# Patient Record
Sex: MALE | Race: WHITE | NOT HISPANIC OR LATINO | Employment: OTHER | URBAN - NONMETROPOLITAN AREA
[De-identification: names, ages, dates, MRNs, and addresses within clinical notes are randomized per-mention and may not be internally consistent; named-entity substitution may affect disease eponyms.]

---

## 2017-02-08 ENCOUNTER — TRANSCRIBE ORDERS (OUTPATIENT)
Dept: PHYSICAL THERAPY | Facility: HOSPITAL | Age: 22
End: 2017-02-08

## 2017-02-08 ENCOUNTER — HOSPITAL ENCOUNTER (OUTPATIENT)
Dept: PHYSICAL THERAPY | Facility: HOSPITAL | Age: 22
Setting detail: THERAPIES SERIES
Discharge: HOME | End: 2017-02-08

## 2017-02-08 DIAGNOSIS — M79.672 BILATERAL FOOT PAIN: ICD-10-CM

## 2017-02-08 DIAGNOSIS — M25.551 BILATERAL HIP PAIN: Primary | ICD-10-CM

## 2017-02-08 DIAGNOSIS — M79.672 LEFT FOOT PAIN: Primary | ICD-10-CM

## 2017-02-08 DIAGNOSIS — M25.562 BILATERAL ANTERIOR KNEE PAIN: ICD-10-CM

## 2017-02-08 DIAGNOSIS — M79.671 BILATERAL FOOT PAIN: ICD-10-CM

## 2017-02-08 DIAGNOSIS — M25.561 BILATERAL ANTERIOR KNEE PAIN: ICD-10-CM

## 2017-02-08 DIAGNOSIS — M25.552 BILATERAL HIP PAIN: Primary | ICD-10-CM

## 2017-02-08 PROCEDURE — 97161 PT EVAL LOW COMPLEX 20 MIN: CPT | Performed by: PHYSICAL THERAPIST

## 2017-02-08 NOTE — PROGRESS NOTES
Outpatient Physical Therapy Ortho Initial Evaluation  [facility]     Patient Name: Artemio Lockhart  : 1995  MRN: [id number]  Today's Date: 2017      Visit Date: 2017     Visit 1 of 1  Recert date 3/1/17      There is no problem list on file for this patient.       Past Medical History   Diagnosis Date   • Cancer         Past Surgical History   Procedure Laterality Date   • Hernia repair         Visit Dx:     ICD-10-CM ICD-9-CM   1. Left foot pain M79.672 729.5             Patient History       17 1300          History    Chief Complaint Difficulty Walking;Pain;Numbness;Muscle weakness  -KW      Type of Pain Ankle pain  -KW      Brief Description of Current Complaint deformity of L foot   -KW      Previous treatment for THIS PROBLEM Rehabilitation  -KW      Hand Dominance left-handed  -KW      Occupation/sports/leisure activities basket ball, walking  -KW      Surgery Date 1 16  -KW      Pain     Pain Location Foot  -KW      Pain at Present 6  -KW      Tolerance Time- Standing 15 min  -KW      Tolerance Time- Walking 5 min  -KW      Fall Risk Assessment    Any falls in the past year: No  -KW        User Key  (r) = Recorded By, (t) = Taken By, (c) = Cosigned By    Initials Name Provider Type    KW Sonja Gusman, PT Physical Therapist                PT Ortho       17 1300    Subjective Comments    Subjective Comments Reports L foot is very sensitive  -KW    Precautions and Contraindications    Precautions/Limitations other (see comments)   WBAT L LE  -KW    Subjective Pain    Able to rate subjective pain? yes  -KW    Pre-Treatment Pain Level 6  -KW    Posture/Observations    Posture/Observations Comments muscle atrophy on L quads, calf, and foot  -KW    ROM (Range of Motion)    Additional Documentation --   AROM L DF 5, PF 30, INV 12, EV 0  -KW    MMT (Manual Muscle Testing)    General MMT Assessment Detail --   L DF 3+/5, PF, 3/5, Inv 3+/5, EV 2+/5  -KW     Flexibility    Flexibility Tested? Lower Extremity  -KW    Lower Extremity Flexibility    Hamstrings Left:;Moderately limited  -KW    Gastrocnemius Left:;Moderately limited  -KW    Soleus Left:;Moderately limited  -KW    Balance Skills Training    SLS L SLS for 5 sec  -KW    Gait Assessment/Treatment    Gait, Comment patient ambulates with CAM boot and is WBAT on L LE  -KW      User Key  (r) = Recorded By, (t) = Taken By, (c) = Cosigned By    Initials Name Provider Type    LUISA Gusman PT Physical Therapist                            Therapy Education       02/08/17 1357    Therapy Education    Given HEP  -KW    Program New  -KW    How Provided Written  -KW    Provided to Patient  -KW      User Key  (r) = Recorded By, (t) = Taken By, (c) = Cosigned By    Initials Name Provider Type    LUISA Gusman PT Physical Therapist                PT OP Goals       02/08/17 1400          PT Short Term Goals    STG Date to Achieve 02/22/17  -KW      STG 1 decrease L foot pain to 4/10  -KW      STG 2 Increase AROM L foot by 5 degrees in all planes  -KW      STG 3 Inc SLS L foot to 10 sec  -KW      Long Term Goals    LTG Date to Achieve 02/28/17  -KW      LTG 1 ambulate in L AFO with minimal pain  -KW      LTG 2 Inc L AROM in foot to WFL  -KW      LTG 3 Inc L foot DF and PF to 4/5 MS  -KW      LTG 4 Inc LEFS score to > or equal to 70/80  -KW      Time Calculation    PT Goal Re-Cert Due Date 03/01/17  -KW        User Key  (r) = Recorded By, (t) = Taken By, (c) = Cosigned By    Initials Name Provider Type    LUISA Gusman, PT Physical Therapist                PT Assessment/Plan       02/08/17 1359          PT Assessment    Functional Limitations Impaired gait;Impaired locomotion  -KW      Impairments Balance;Gait;Endurance;Pain;Muscle strength;Range of motion;Sensation;Joint mobility  -KW      Assessment Comments hypersensitivity of L foot, pain and weakness  -KW      Please refer to paper survey for additional  self-reported information No  -KW      Rehab Potential Good  -KW      Patient/caregiver participated in establishment of treatment plan and goals Yes  -KW      Patient would benefit from skilled therapy intervention Yes  -KW      PT Plan    PT Frequency 2x/week  -KW      Predicted Duration of Therapy Intervention (days/wks) 4 weeks  -KW      Planned CPT's? PT EVAL: 14249;PT RE-EVAL: 97086;PT THER PROC EA 15 MIN: 35944;PT THER ACT EA 15 MIN: 26852;PT MANUAL THERAPY EA 15 MIN: 92762;PT AQUATIC THERAPY EA 15 MIN: 25808;PT GAIT TRAINING EA 15 MIN: 74667;PT ELECTRICAL STIM UNATTEND: ;PT THER SUPP EA 15 MIN  -KW      Physical Therapy Interventions (Optional Details) aquatics exercise;balance training;gait training;home exercise program;joint mobilization;modalities;manual therapy techniques;ROM (Range of Motion);stair training;strengthening;stretching  -KW      PT Plan Comments 2x week PT 1 land 1 pool  -KW        User Key  (r) = Recorded By, (t) = Taken By, (c) = Cosigned By    Initials Name Provider Type    LUISA Gusman, PT Physical Therapist                  Exercises       02/08/17 1300          Subjective Comments    Subjective Comments Reports L foot is very sensitive  -KW      Subjective Pain    Able to rate subjective pain? yes  -KW      Pre-Treatment Pain Level 6  -KW        User Key  (r) = Recorded By, (t) = Taken By, (c) = Cosigned By    Initials Name Provider Type    LUISA Gusman, PT Physical Therapist                              Outcome Measures       02/08/17 1400          Lower Extremity Functional Index    Any of your usual work, housework or school activities 4  -KW      Your usual hobbies, recreational or sporting activities 4  -KW      Getting into or out of the bath 3  -KW      Walking between rooms 3  -KW      Putting on your shoes or socks 3  -KW      Squatting 3  -KW      Lifting an object, like a bag of groceries from the floor 4  -KW      Performing light activities around your  home 3  -KW      Performing heavy activities around your home 3  -KW      Getting into or out of a car 3  -KW      Walking 2 blocks 2  -KW      Walking a mile 1  -KW      Going up or down 10 stairs (about 1 flight of stairs) 3  -KW      Standing for 1 hour 1  -KW      Sitting for 1 hour 4  -KW      Running on even ground 1  -KW      Running on uneven ground 1  -KW      Making sharp turns while running fast 1  -KW      Hopping 2  -KW      Rolling over in bed 3  -KW      Total 52  -KW      Functional Assessment    Outcome Measure Options Lower Extremity Functional Scale (LEFS)  -KW        User Key  (r) = Recorded By, (t) = Taken By, (c) = Cosigned By    Initials Name Provider Type    KW Sonja Gusman, PT Physical Therapist            Time Calculation:   Start Time: 1300  Stop Time: 1350  Time Calculation (min): 50 min  Total Timed Code Minutes- PT: 50 minute(s)     Therapy Charges for Today     Code Description Service Date Service Provider Modifiers Qty    35081396847 HC PT EVAL LOW COMPLEXITY 3 2/8/2017 Sonja Gusman, PT GP 1          PT G-Codes  Outcome Measure Options: Lower Extremity Functional Scale (LEFS)         Sonja Gusman, PT  2/8/2017

## 2017-02-13 ENCOUNTER — APPOINTMENT (OUTPATIENT)
Dept: PHYSICAL THERAPY | Facility: HOSPITAL | Age: 22
End: 2017-02-13

## 2017-02-20 ENCOUNTER — HOSPITAL ENCOUNTER (OUTPATIENT)
Dept: PHYSICAL THERAPY | Facility: HOSPITAL | Age: 22
Setting detail: THERAPIES SERIES
Discharge: HOME | End: 2017-02-20

## 2017-02-20 DIAGNOSIS — M79.672 LEFT FOOT PAIN: Primary | ICD-10-CM

## 2017-02-20 PROCEDURE — 97110 THERAPEUTIC EXERCISES: CPT

## 2017-02-20 PROCEDURE — 97116 GAIT TRAINING THERAPY: CPT

## 2017-02-20 NOTE — PROGRESS NOTES
Outpatient Physical Therapy Ortho Treatment Note  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 DOS 16, Removed hardware 2017.  WBAT out of boot and into AFO no restrictions per MD order.     Visit Dx:    ICD-10-CM ICD-9-CM   1. Left foot pain M79.672 729.5       There is no problem list on file for this patient.       Past Medical History   Diagnosis Date   • Cancer    • Cellulitis    • Closed fracture of foot      Closed fracture of foot - History of   • Congenital anomaly of foot    • Congenital pes cavus    • Cradle cap      Cradle cap - History of   • Foot pain      Growth plate inflammation, left foot   • H/O leukemia    • Myopia    • Otitis media      Bilateral        Past Surgical History   Procedure Laterality Date   • Hernia repair     • Ankle surgery Left 2015     First Ankle surgery to correct cavovarus deformity/Second surgery 16             PT Ortho       17 0846    Precautions and Contraindications    Precautions WBAT Left LE with AFO  -    Contraindications No restrictions per MD order. Progress as pt tolerates out of Walking boot.   -    Posture/Observations    Posture/Observations Comments Scars healing very tender Left foot around scars.  Muscle atrophy also noted in Left LE.    -    Sensation    Additional Comments Educated pt and caregiver on desenization of left foot for increased gait and strength needed.  Very sensitive TTP left foot.    -    Gait Assessment/Treatment    Gait, Comment Pt ambulates with walking boot on left into clinic. Donned AFO on left for walking during treatment and exited building with AFO.  Pain with gait in lateral  foot secondary to rubbing of brace with gait.  Reddness noted over lateral scar after gait lasted 10 minutes.    -      User Key  (r) = Recorded By, (t) = Taken By, (c) = Cosigned By    Initials Name Provider Type    FRANCY Steele PTA Physical Therapy Assistant                            PT Assessment/Plan       02/20/17 0846          PT Assessment    Assessment Comments Encouraged pt to increase gt with AFO.  Increased HEP this date pain with WB secondary tenderness.  Reddness noted over lateral scar just inferior to lateral mallioli after 5 minutes of gait. Lasted 10 minutes. Educated mother that if reddness continues call orthotist to have it adjusted.   -      PT Plan    PT Plan Comments Pt to be seen in aquatics next. 1xland/1pool per PT.  Recheck scheduled for next week.  Monitor reddness and gait with brace.  Increase strength as able.  -        User Key  (r) = Recorded By, (t) = Taken By, (c) = Cosigned By    Initials Name Provider Type    FRANCY Steele PTA Physical Therapy Assistant                    Exercises       02/20/17 0846          Subjective Comments    Subjective Comments Mom present reports that they have tried doing exercises at home.  Still not increasing walking with AFo secondary to tenderness and still sensitive. Norman reports he pulled off a piece of skin on bottom of left foot thats why he is wearing a bandaid.   -      Subjective Pain    Able to rate subjective pain? yes  -      Pre-Treatment Pain Level 5  -KH      Post-Treatment Pain Level 5  -KH      Exercise 1    Exercise Name 1 Gastroc Stretch with Towel  -      Reps 1 3  -KH      Time (Seconds) 1 30  -KH      Exercise 2    Exercise Name 2 Toe scrunches  -      Time (Minutes) 2 3  -KH      Exercise 3    Exercise Name 3 Bridges  -KH      Sets 3 2  -KH      Reps 3 10  -KH      Exercise 4    Exercise Name 4 Clam Shells B  -KH      Sets 4 2  -KH      Reps 4 10  -KH      Exercise 5    Exercise Name 5 Supine Quad Sets  -KH       Sets 5 2  -KH      Reps 5 10  -KH      Exercise 6    Exercise Name 6 Gait // Bars with AFO L  -KH      Reps 6 4  -KH      Exercise 7    Exercise Name 7 Seated CR/TR  -KH      Sets 7 2  -KH      Reps 7 10  -KH        User Key  (r) = Recorded By, (t) = Taken By, (c) = Cosigned By    Initials Name Provider Type    FRANCY Steele PTA Physical Therapy Assistant                               PT OP Goals       02/20/17 0846 02/08/17 1400       PT Short Term Goals    STG Date to Achieve 02/22/17  -KH 02/22/17  -KW     STG 1 decrease L foot pain to 4/10  -KH decrease L foot pain to 4/10  -KW     STG 1 Progress Not Met  -KH      STG 2 Increase AROM L foot by 5 degrees in all planes  -KH Increase AROM L foot by 5 degrees in all planes  -KW     STG 2 Progress Not Met  -KH      STG 3 Inc SLS L foot to 10 sec  -KH Inc SLS L foot to 10 sec  -KW     STG 3 Progress Not Met  -KH      Long Term Goals    LTG Date to Achieve 02/28/17  -KH 02/28/17  -KW     LTG 1 ambulate in L AFO with minimal pain  -KH ambulate in L AFO with minimal pain  -KW     LTG 1 Progress Progressing  -KH      LTG 2 Inc L AROM in foot to WFL  -KH Inc L AROM in foot to WFL  -KW     LTG 2 Progress Not Met  -KH      LTG 3 Inc L foot DF and PF to 4/5 MS  -KH Inc L foot DF and PF to 4/5 MS  -KW     LTG 3 Progress Not Met  -KH      LTG 4 Inc LEFS score to > or equal to 70/80  -KH Inc LEFS score to > or equal to 70/80  -KW     LTG 4 Progress Not Met  -KH      Time Calculation    PT Goal Re-Cert Due Date 03/01/17   2/3, 0%, 7 visits, MD-unsure  -KH 03/01/17  -KW       User Key  (r) = Recorded By, (t) = Taken By, (c) = Cosigned By    Initials Name Provider Type    FRANCY Steele PTA Physical Therapy Assistant    LUISA Gusman, PT Physical Therapist                Therapy Education       02/20/17 0846    Therapy Education    Given HEP   Bridges, Clam Shells, Quad sets, CR/TR, Gait with AFO  -KH    Program Reinforced   Desenization of scars and foot  -KH    How  Provided Verbal;Demonstration;Written  -FRANCY    Provided to Patient;Caregiver  -FRANCY    Level of Understanding Teach back education performed;Verbalized;Demonstrated  -FRANCY      User Key  (r) = Recorded By, (t) = Taken By, (c) = Cosigned By    Initials Name Provider Type    FRANCY Steele PTA Physical Therapy Assistant                Time Calculation:   Start Time: 0846  Stop Time: 0946  Time Calculation (min): 60 min  Total Timed Code Minutes- PT: 60 minute(s)    Therapy Charges for Today     Code Description Service Date Service Provider Modifiers Qty    68828507987  PT THER PROC EA 15 MIN 2/20/2017 Letha Steele PTA GP 3    [id number]  GAIT TRAINING EA 15 MIN 2/20/2017 Letha Steele PTA GP 1                    Letha Steele PTA  2/20/2017

## 2017-02-21 ENCOUNTER — OFFICE VISIT (OUTPATIENT)
Dept: OPHTHALMOLOGY | Facility: CLINIC | Age: 22
End: 2017-02-21

## 2017-02-21 DIAGNOSIS — H52.13 MYOPIA, BILATERAL: Primary | ICD-10-CM

## 2017-02-21 PROCEDURE — 92012 INTRM OPH EXAM EST PATIENT: CPT | Performed by: OPHTHALMOLOGY

## 2017-02-21 RX ORDER — IBUPROFEN 400 MG/1
400 TABLET ORAL EVERY 6 HOURS
COMMUNITY
Start: 2016-11-30

## 2017-02-21 RX ORDER — ERGOCALCIFEROL 1.25 MG/1
CAPSULE ORAL
COMMUNITY
Start: 2016-11-21

## 2017-02-21 RX ORDER — DIAZEPAM 2 MG/1
TABLET ORAL
Refills: 0 | COMMUNITY
Start: 2016-11-30

## 2017-02-21 RX ORDER — ONDANSETRON 4 MG/1
TABLET, FILM COATED ORAL
Refills: 0 | COMMUNITY
Start: 2016-11-30

## 2017-02-21 RX ORDER — OSELTAMIVIR PHOSPHATE 75 MG/1
CAPSULE ORAL
Refills: 0 | COMMUNITY
Start: 2017-02-12

## 2017-02-21 RX ORDER — GABAPENTIN 100 MG/1
CAPSULE ORAL
COMMUNITY
Start: 2016-12-14

## 2017-02-21 NOTE — PROGRESS NOTES
Subjective   Artemio Lockhart is a 21 y.o. male.   Chief Complaint   Patient presents with   • Eye Exam   • Myopia     HPI     Eye Exam   Laterality: both eyes           Comments   No vision change       Last edited by Edis Condon MD on 2/21/2017 10:08 AM. (History)          Review of Systems    Objective   Visual Acuity (Snellen - Linear)      Right Left   Dist cc 20/25 20/20       Correction:  Glasses         Wearing Rx      Sphere Axis   Right -2.50 180   Left -2.50 180           Manifest Refraction      Sphere Axis   Right -2.50 180   Left -2.50 180            Final Rx      Sphere Axis   Right -2.50 180   Left -2.50 180            Pupils      Pupils   Right PERRL   Left PERRL           Confrontational Visual Fields     Visual Fields      Left Right   Result Full Full                  Extraocular Movement      Right Left   Result Full Full                 Main Ophthalmology Exam     External Exam      Right Left    External Normal Normal      Slit Lamp Exam      Right Left    Lids/Lashes Normal Normal    Conjunctiva/Sclera White and quiet White and quiet    Cornea Clear Clear    Anterior Chamber Deep and quiet Deep and quiet    Iris Round and reactive Round and reactive    Lens Clear Clear    Vitreous Normal Normal      Fundus Exam      Right Left    Disc Normal Normal    Macula Normal Normal    Vessels Normal Normal                Assessment/Plan   Diagnoses and all orders for this visit:    Myopia, bilateral    Glasses Rx given per refraction         Return if symptoms worsen or fail to improve.

## 2017-02-22 ENCOUNTER — HOSPITAL ENCOUNTER (OUTPATIENT)
Dept: PHYSICAL THERAPY | Facility: HOSPITAL | Age: 22
Setting detail: THERAPIES SERIES
Discharge: HOME | End: 2017-02-22

## 2017-02-22 DIAGNOSIS — M79.672 LEFT FOOT PAIN: Primary | ICD-10-CM

## 2017-02-22 PROCEDURE — 97110 THERAPEUTIC EXERCISES: CPT

## 2017-02-22 NOTE — PROGRESS NOTES
Outpatient Physical Therapy Ortho Treatment Note  [facility]     Patient Name: Artemio Lockhart  : 1995  MRN: [id number]  Today's Date: 2017      Visit Date: 2017  Subjective Improvement: 0%  MD visit: unsure  Visit Number: 3/4  Total Approved:7 visits  Recert Date: 3-1-17        Visit Dx:    ICD-10-CM ICD-9-CM   1. Left foot pain M79.672 729.5       There is no problem list on file for this patient.       Past Medical History   Diagnosis Date   • Cancer    • Cellulitis    • Closed fracture of foot      Closed fracture of foot - History of   • Congenital anomaly of foot    • Congenital pes cavus    • Cradle cap      Cradle cap - History of   • Foot pain      Growth plate inflammation, left foot   • H/O leukemia    • Myopia    • Otitis media      Bilateral        Past Surgical History   Procedure Laterality Date   • Hernia repair     • Ankle surgery Left 2015     First Ankle surgery to correct cavovarus deformity/Second surgery 16             PT Ortho       17 0929    Posture/Observations    Posture/Observations Comments AFO Left this date.  No reddness noted over lateral scar this date.  Scars good and healing.  Decrease heel-toe gait.   -      17 0846    Precautions and Contraindications    Precautions WBAT Left LE with AFO  -    Contraindications No restrictions per MD order. Progress as pt tolerates out of Walking boot.   -    Posture/Observations    Posture/Observations Comments Scars healing very tender Left foot around scars.  Muscle atrophy also noted in Left LE.    -    Sensation    Additional Comments Educated pt and caregiver on desenization of left foot for increased gait and strength needed.  Very sensitive TTP left foot.    -    Gait Assessment/Treatment    Gait, Comment Pt ambulates with walking boot on left into clinic. Donned AFO on left for walking during treatment and exited building with AFO.  Pain with gait in lateral foot  secondary to rubbing of brace with gait.  Reddness noted over lateral scar after gait lasted 10 minutes.    -      User Key  (r) = Recorded By, (t) = Taken By, (c) = Cosigned By    Initials Name Provider Type    FRANCY Steele PTA Physical Therapy Assistant                            PT Assessment/Plan       02/22/17 0929 02/20/17 0846       PT Assessment    Assessment Comments Initated aquatics this date.  Pt did well multiple VC to perform exercises correctly and correct gait.   - Encouraged pt to increase gt with AFO.  Increased HEP this date pain with WB secondary tenderness.  Reddness noted over lateral scar just inferior to lateral mallioli after 5 minutes of gait. Lasted 10 minutes. Educated mother that if reddness continues call orthotist to have it adjusted.   -     PT Plan    PT Plan Comments Continue to increase gait and desensitize left foot. Aquatics as able. Recheck next week.    -FRANCY Pt to be seen in aquatics next. 1xland/1pool per PT.  Recheck scheduled for next week.  Monitor reddness and gait with brace.  Increase strength as able.  -       User Key  (r) = Recorded By, (t) = Taken By, (c) = Cosigned By    Initials Name Provider Type    FRANCY Steele PTA Physical Therapy Assistant                    Exercises       02/22/17 0929          Subjective Comments    Subjective Comments pt report that he has been rubbing the foot at home to help with the sensitvity.  also reports thatbhe has been wearing his brace and not having any reddness.    -      Subjective Pain    Able to rate subjective pain? yes  -      Pre-Treatment Pain Level 3  -      Post-Treatment Pain Level 3  -      Subjective Pain Comment    -      Aquatics    Aquatics performed? Yes  -      Exercise 1    Exercise Name 1 aqu: walking chest deep fwd lat bkds  -      Time (Minutes) 1 5 each  -KH      Exercise 2    Exercise Name 2 aqu: SLR B 3-way  -KH      Sets 2 2  -KH      Reps 2 10  -KH      Exercise 3    Exercise  Name 3 aqu: Calf raises  -KH      Sets 3 2  -KH      Reps 3 10  -KH      Exercise 4    Exercise Name 4 aqu: toe raises  -KH      Sets 4 2  -KH      Reps 4 10  -KH      Exercise 5    Exercise Name 5 aqu: mini squats  -KH      Sets 5 2  -KH      Reps 5 10  -KH        User Key  (r) = Recorded By, (t) = Taken By, (c) = Cosigned By    Initials Name Provider Type    FRANCY Steele PTA Physical Therapy Assistant                               PT OP Goals       02/22/17 0929 02/20/17 0846       PT Short Term Goals    STG Date to Achieve 02/22/17  -KH 02/22/17  -KH     STG 1 decrease L foot pain to 4/10  -KH decrease L foot pain to 4/10  -KH     STG 1 Progress Not Met  -KH Not Met  -KH     STG 2 Increase AROM L foot by 5 degrees in all planes  -KH Increase AROM L foot by 5 degrees in all planes  -KH     STG 2 Progress Not Met  -KH Not Met  -KH     STG 3 Inc SLS L foot to 10 sec  -KH Inc SLS L foot to 10 sec  -KH     STG 3 Progress Not Met  -KH Not Met  -KH     Long Term Goals    LTG Date to Achieve 02/28/17  -KH 02/28/17  -KH     LTG 1 ambulate in L AFO with minimal pain  -KH ambulate in L AFO with minimal pain  -KH     LTG 1 Progress Progressing  -KH Progressing  -KH     LTG 2 Inc L AROM in foot to WFL  -KH Inc L AROM in foot to WFL  -KH     LTG 2 Progress Not Met  -KH Not Met  -KH     LTG 3 Inc L foot DF and PF to 4/5 MS  -KH Inc L foot DF and PF to 4/5 MS  -KH     LTG 3 Progress Not Met  -KH Not Met  -KH     LTG 4 Inc LEFS score to > or equal to 70/80  -KH Inc LEFS score to > or equal to 70/80  -KH     LTG 4 Progress Not Met  -KH Not Met  -KH     Time Calculation    PT Goal Re-Cert Due Date 03/01/17   3/4, 0%, 7 visits, MD-unsure  -FRANCY 03/01/17   2/3, 0%, 7 visits, MD-unsuesvin ELDRIDGE       User Key  (r) = Recorded By, (t) = Taken By, (c) = Cosigned By    Initials Name Provider Type    FRANCY Steele PTA Physical Therapy Assistant                Therapy Education       02/20/17 4973    Therapy Education    Given HEP   Bridges,  Clam Shells, Quad sets, CR/TR, Gait with AFO  -KH    Program Reinforced   Desenization of scars and foot  -KH    How Provided Verbal;Demonstration;Written  -FRANCY    Provided to Patient;Caregiver  -FRANCY    Level of Understanding Teach back education performed;Verbalized;Demonstrated  -KH      User Key  (r) = Recorded By, (t) = Taken By, (c) = Cosigned By    Initials Name Provider Type    FRANCY Steele PTA Physical Therapy Assistant                Time Calculation:   Start Time: 0929  Stop Time: 1016  Time Calculation (min): 47 min  Total Timed Code Minutes- PT: 47 minute(s)    Therapy Charges for Today     Code Description Service Date Service Provider Modifiers Qty    36732885857 HC PT THER PROC EA 15 MIN 2/22/2017 Letha Steele PTA GP 3                    Letha Steele PTA  2/22/2017

## 2017-02-27 ENCOUNTER — HOSPITAL ENCOUNTER (OUTPATIENT)
Dept: PHYSICAL THERAPY | Facility: HOSPITAL | Age: 22
Setting detail: THERAPIES SERIES
Discharge: HOME | End: 2017-02-27

## 2017-02-27 DIAGNOSIS — M79.672 LEFT FOOT PAIN: Primary | ICD-10-CM

## 2017-02-27 PROCEDURE — 97110 THERAPEUTIC EXERCISES: CPT

## 2017-02-27 NOTE — PROGRESS NOTES
Outpatient Physical Therapy Ortho Treatment Note  [facility]     Patient Name: Aretmio Lockhart  : 1995  MRN: [id number]  Today's Date: 2017      Visit Date: 2017   Subjective Improvement: 10%  MD visit: unsure  Visit Number:   Total Approved:7 total   Recert Date: 3/1/17      Visit Dx:    ICD-10-CM ICD-9-CM   1. Left foot pain M79.672 729.5       There is no problem list on file for this patient.       Past Medical History   Diagnosis Date   • Cancer    • Cellulitis    • Closed fracture of foot      Closed fracture of foot - History of   • Congenital anomaly of foot    • Congenital pes cavus    • Cradle cap      Cradle cap - History of   • Foot pain      Growth plate inflammation, left foot   • H/O leukemia    • Myopia    • Otitis media      Bilateral        Past Surgical History   Procedure Laterality Date   • Hernia repair     • Ankle surgery Left 2015     First Ankle surgery to correct cavovarus deformity/Second surgery 16                             PT Assessment/Plan       17 0938       PT Assessment    Assessment Comments better tolerance to aquatics this date.  still needs vc to stay on task and to complete exercise without overdoing reps.  still has pain in the foot post treatment.    -     PT Plan    PT Plan Comments recheck with PT next visit.  continue as advised  3 more approved visits.   -       User Key  (r) = Recorded By, (t) = Taken By, (c) = Cosigned By    Initials Name Provider Type    FRANCY Steele PTA Physical Therapy Assistant                    Exercises       17 0938          Subjective Comments    Subjective Comments reports that he did well after the aquatic treatment last visit.  didn't have increase pain  -FRANCY      Subjective Pain    Able to rate subjective pain? yes  -FRANCY      Pre-Treatment Pain Level 5  -KH      Post-Treatment Pain Level 3  -KH      Aquatics    Aquatics performed? Yes  -FRANCY      Exercise 1    Exercise Name 1  aqu: walking chest deep fwd lat bkds  -KH      Time (Minutes) 1 5 each  -KH      Exercise 2    Exercise Name 2 aqu: SLR B 3-way  -KH      Sets 2 2  -KH      Reps 2 10  -KH      Exercise 3    Exercise Name 3 aqu: Calf raises  -KH      Sets 3 2  -KH      Reps 3 10  -KH      Exercise 4    Exercise Name 4 aqu: toe raises  -KH      Sets 4 2  -KH      Reps 4 10  -KH      Exercise 5    Exercise Name 5 aqu: mini squats  -KH      Sets 5 2  -KH      Reps 5 10  -KH        User Key  (r) = Recorded By, (t) = Taken By, (c) = Cosigned By    Initials Name Provider Type    FRANCY Steele PTA Physical Therapy Assistant                               PT OP Goals       02/27/17 0938       PT Short Term Goals    STG Date to Achieve 02/22/17  -KH     STG 1 decrease L foot pain to 4/10  -KH     STG 1 Progress Not Met  -KH     STG 2 Increase AROM L foot by 5 degrees in all planes  -KH     STG 2 Progress Not Met  -     STG 3 Inc SLS L foot to 10 sec  -KH     STG 3 Progress Not Met  -     Long Term Goals    LTG Date to Achieve 02/28/17  -KH     LTG 1 ambulate in L AFO with minimal pain  -     LTG 1 Progress Progressing  -KH     LTG 2 Inc L AROM in foot to WFL  -KH     LTG 2 Progress Not Met  -KH     LTG 3 Inc L foot DF and PF to 4/5 MS  -KH     LTG 3 Progress Not Met  -KH     LTG 4 Inc LEFS score to > or equal to 70/80  -     LTG 4 Progress Not Met  -KH     Time Calculation    PT Goal Re-Cert Due Date 03/01/17   4/5, 7 visits, md-unsure, 10%  -KH       User Key  (r) = Recorded By, (t) = Taken By, (c) = Cosigned By    Initials Name Provider Type    FRANCY Steele PTA Physical Therapy Assistant                    Time Calculation:   Start Time: 0938  Stop Time: 1020  Time Calculation (min): 42 min  Total Timed Code Minutes- PT: 42 minute(s)    Therapy Charges for Today     Code Description Service Date Service Provider Modifiers Qty    37249499597  PT THER PROC EA 15 MIN 2/27/2017 Letha Steele PTA GP 3                    Letha Steele  PTA  2/27/2017

## 2017-03-01 ENCOUNTER — HOSPITAL ENCOUNTER (OUTPATIENT)
Dept: PHYSICAL THERAPY | Facility: HOSPITAL | Age: 22
Setting detail: THERAPIES SERIES
Discharge: HOME | End: 2017-03-01

## 2017-03-01 DIAGNOSIS — M79.672 LEFT FOOT PAIN: Primary | ICD-10-CM

## 2017-03-01 PROCEDURE — 97110 THERAPEUTIC EXERCISES: CPT | Performed by: PHYSICAL THERAPIST

## 2017-03-01 PROCEDURE — 97164 PT RE-EVAL EST PLAN CARE: CPT | Performed by: PHYSICAL THERAPIST

## 2017-03-01 PROCEDURE — 97140 MANUAL THERAPY 1/> REGIONS: CPT | Performed by: PHYSICAL THERAPIST

## 2017-03-01 NOTE — PROGRESS NOTES
Outpatient Physical Therapy Ortho Progress Note  [facility]     Patient Name: Artemio Lockhart  : 1995  MRN: [id number]  Today's Date: 3/1/2017      Visit Date: 2017     Visit 5 of 6  Recert date 3/22/17  MD visit unsure  5-10% improvement  2 more approved visits left          There is no problem list on file for this patient.       Past Medical History   Diagnosis Date   • Cancer    • Cellulitis    • Closed fracture of foot      Closed fracture of foot - History of   • Congenital anomaly of foot    • Congenital pes cavus    • Cradle cap      Cradle cap - History of   • Foot pain      Growth plate inflammation, left foot   • H/O leukemia    • Myopia    • Otitis media      Bilateral        Past Surgical History   Procedure Laterality Date   • Hernia repair     • Ankle surgery Left 2015     First Ankle surgery to correct cavovarus deformity/Second surgery 16       Visit Dx:     ICD-10-CM ICD-9-CM   1. Left foot pain M79.672 729.5                 PT Ortho       17 0900    ROM (Range of Motion)    General ROM Detail L foot DF 5, PF 28, Inv 20, EV 0  -KW    Lower Extremity Flexibility    Hamstrings Left:;Mildly limited  -KW    Gastrocnemius Left:;Mildly limited  -KW    Soleus Left:;Mildly limited  -KW    Balance Skills Training    SLS L SLS for 6 sec  -KW      User Key  (r) = Recorded By, (t) = Taken By, (c) = Cosigned By    Initials Name Provider Type    LUISA Gusman, CURTIS Physical Therapist                            Therapy Education       17 0931          Therapy Education    Given HEP  -KW      Program Reinforced  -KW      How Provided Verbal  -KW      Provided to Patient  -KW      Level of Understanding Verbalized  -KW        User Key  (r) = Recorded By, (t) = Taken By, (c) = Cosigned By    Initials Name Provider Type    LUISA Gusman, CURTIS Physical Therapist                PT OP Goals       17 0900       PT Short Term Goals    STG Date to Achieve  03/15/17  -KW     STG 1 decrease L foot pain to 3/10  -KW     STG 2 Increase AROM L foot by 3 degrees in PF and DF  -KW     STG 3 Inc SLS L foot to 7 sec  -KW     Long Term Goals    LTG Date to Achieve 03/22/17  -KW     LTG 1 ambulate in L AFO with minimal pain  -KW     LTG 2 decrease L hamstring and calf mm flexibility from min to WNL  -KW     LTG 3 Inc L foot DF and PF to 4/5 MS  -KW     LTG 4 Inc LEFS score to > or equal to 70/80  -KW     Time Calculation    PT Goal Re-Cert Due Date 03/22/17  -KW       User Key  (r) = Recorded By, (t) = Taken By, (c) = Cosigned By    Initials Name Provider Type    LUISA Gusman PT Physical Therapist                PT Assessment/Plan       03/01/17 0932       PT Assessment    Functional Limitations Impaired gait;Impaired locomotion  -KW     Impairments Balance;Range of motion;Joint mobility;Joint integrity;Sensation;Locomotion;Gait;Pain;Muscle strength  -KW     Assessment Comments pain in L foot is slowly getting  a little better. Pt enjoys therex in the pool.  -KW     Please refer to paper survey for additional self-reported information No  -KW     Rehab Potential Good  -KW     Patient/caregiver participated in establishment of treatment plan and goals Yes  -KW     Patient would benefit from skilled therapy intervention Yes  -KW     PT Plan    PT Frequency 2x/week  -KW     Predicted Duration of Therapy Intervention (days/wks) 4 weeks  -KW     PT Plan Comments continue aquatics, 2 more approved visits  -KW       User Key  (r) = Recorded By, (t) = Taken By, (c) = Cosigned By    Initials Name Provider Type    LUISA Gusman, PT Physical Therapist                  Exercises       03/01/17 0900          Subjective Comments    Subjective Comments Reports 5% improvement in overall symptoms  -KW      Subjective Pain    Able to rate subjective pain? yes  -KW      Pre-Treatment Pain Level 3  -KW      Exercise 1    Exercise Name 1 pro II level 1  -KW      Time (Minutes) 1 10   -KW      Exercise 2    Exercise Name 2 SLS balance on L LE  -KW      Time (Minutes) 2 3  -KW        User Key  (r) = Recorded By, (t) = Taken By, (c) = Cosigned By    Initials Name Provider Type    LUISA Gusman PT Physical Therapist           Manual Rx (last 36 hours)      Manual Treatments       03/01/17 0900          Manual Rx 1    Manual Rx 1 Location L foot   -KW      Manual Rx 1 Type cupping   -KW      Manual Rx 1 Duration 15 min  -KW        User Key  (r) = Recorded By, (t) = Taken By, (c) = Cosigned By    Initials Name Provider Type    LUISA Gusman PT Physical Therapist                            Time Calculation:   Start Time: 0900  Stop Time: 0940  Time Calculation (min): 40 min  Total Timed Code Minutes- PT: 40 minute(s)     Therapy Charges for Today     Code Description Service Date Service Provider Modifiers Qty    11897900086 HC PT RE-EVAL ESTABLISHED PLAN 2 3/1/2017 Sonja Gusman, PT GP 1    02728074399 HC PT THER PROC EA 15 MIN 3/1/2017 Sonja Gusman, PT GP 1    58153261801 HC PT MANUAL THERAPY EA 15 MIN 3/1/2017 Sonja Gusman, PT GP 1                    Sonja Gusman, PT  3/1/2017

## 2017-03-06 ENCOUNTER — HOSPITAL ENCOUNTER (OUTPATIENT)
Dept: PHYSICAL THERAPY | Facility: HOSPITAL | Age: 22
Setting detail: THERAPIES SERIES
Discharge: HOME | End: 2017-03-06

## 2017-03-06 DIAGNOSIS — M79.672 LEFT FOOT PAIN: Primary | ICD-10-CM

## 2017-03-06 PROCEDURE — 97110 THERAPEUTIC EXERCISES: CPT

## 2017-03-06 NOTE — PROGRESS NOTES
Outpatient Physical Therapy Ortho Treatment Note  [facility]     Patient Name: Artemio Lockhart  : 1995  MRN: [id number]  Today's Date: 3/6/2017      Visit Date: 2017   Subjective Improvement: 10%  MD visit: 3/15/17  Visit Number: 6  Total Approved: 7 total  Recert Date: 3/22/17      Visit Dx:    ICD-10-CM ICD-9-CM   1. Left foot pain M79.672 729.5       There is no problem list on file for this patient.       Past Medical History   Diagnosis Date   • Cancer    • Cellulitis    • Closed fracture of foot      Closed fracture of foot - History of   • Congenital anomaly of foot    • Congenital pes cavus    • Cradle cap      Cradle cap - History of   • Foot pain      Growth plate inflammation, left foot   • H/O leukemia    • Myopia    • Otitis media      Bilateral        Past Surgical History   Procedure Laterality Date   • Hernia repair     • Ankle surgery Left 2015     First Ankle surgery to correct cavovarus deformity/Second surgery 16             PT Ortho       17 0933    Subjective Pain    Post-Treatment Pain Level 6  -    Posture/Observations    Posture/Observations Comments gait antalgic with AFO on left.   -      User Key  (r) = Recorded By, (t) = Taken By, (c) = Cosigned By    Initials Name Provider Type    FRANCY Steele PTA Physical Therapy Assistant                            PT Assessment/Plan       17 0933       PT Assessment    Assessment Comments good tolerance to aquatics with increase WB exercises this date without increase pain.  still weak in left ankle/foot.   -     PT Plan    PT Frequency 2x/week  -     Predicted Duration of Therapy Intervention (days/wks) 4 weeks  -     PT Plan Comments continue with land next visit.  has one more apporved visit and will ask for more to continue increase strength and decrease pain in the left foot/ankle.   -       User Key  (r) = Recorded By, (t) = Taken By, (c) = Cosigned By    Initials Name  Provider Type    FRANCY Steele PTA Physical Therapy Assistant                    Exercises       03/06/17 0933          Subjective Comments    Subjective Comments pt reports the he feels the leg is getting some better.  still reports pain and difficulty walking   -KH      Subjective Pain    Able to rate subjective pain? yes  -KH      Pre-Treatment Pain Level 6  -KH      Post-Treatment Pain Level 6  -KH      Aquatics    Aquatics performed? Yes  -KH      Exercise 1    Exercise Name 1 aqu: fwd walking   -KH      Time (Minutes) 1 5 minutes  -KH      Exercise 2    Exercise Name 2 aqu: lat walking  -KH      Time (Minutes) 2 5 minutes  -KH      Exercise 3    Exercise Name 3 aqu: bkd walking  -KH      Time (Minutes) 3 5 min  -KH      Exercise 4    Exercise Name 4 aqu:slr 3 way  -KH      Sets 4 1  -KH      Reps 4 10  -KH      Exercise 5    Exercise Name 5 aqu: step ups left  -KH      Sets 5 2  -KH      Reps 5 10  -KH      Exercise 6    Exercise Name 6 aqu: mini squats  -KH      Sets 6 3  -KH      Reps 6 10  -KH      Exercise 7    Exercise Name 7 aqu:cr/tr  -KH      Sets 7 3  -KH      Reps 7 10  -KH      Exercise 8    Exercise Name 8 aqu: lat step ups   -KH      Sets 8 2  -KH      Reps 8 10  -KH        User Key  (r) = Recorded By, (t) = Taken By, (c) = Cosigned By    Initials Name Provider Type    FRANCY Steele PTA Physical Therapy Assistant                               PT OP Goals       03/06/17 0933       PT Short Term Goals    STG Date to Achieve 03/15/17  -KH     STG 1 decrease L foot pain to 3/10  -KH     STG 1 Progress Not Met  -KH     STG 2 Increase AROM L foot by 3 degrees in PF and DF  -KH     STG 2 Progress Not Met  -KH     STG 3 Inc SLS L foot to 7 sec  -KH     STG 3 Progress Not Met  -KH     Long Term Goals    LTG Date to Achieve 03/22/17  -KH     LTG 1 ambulate in L AFO with minimal pain  -KH     LTG 1 Progress Progressing  -KH     LTG 2 decrease L hamstring and calf mm flexibility from min to WNL  -KH     LTG  2 Progress Not Met  -     LTG 3 Inc L foot DF and PF to 4/5 MS  -     LTG 3 Progress Not Met  -     LTG 4 Inc LEFS score to > or equal to 70/80  -     LTG 4 Progress Not Met  -     Time Calculation    PT Goal Re-Cert Due Date 03/22/17  -       User Key  (r) = Recorded By, (t) = Taken By, (c) = Cosigned By    Initials Name Provider Type    FRANCY Steele PTA Physical Therapy Assistant                    Time Calculation:   Start Time: 0933  Stop Time: 1015  Time Calculation (min): 42 min  Total Timed Code Minutes- PT: 42 minute(s)    Therapy Charges for Today     Code Description Service Date Service Provider Modifiers Qty    06465316903 HC PT THER PROC EA 15 MIN 3/6/2017 Letha Steele PTA GP 3                    Letha Steele PTA  3/6/2017

## 2017-03-08 ENCOUNTER — TRANSCRIBE ORDERS (OUTPATIENT)
Dept: PHYSICAL THERAPY | Facility: HOSPITAL | Age: 22
End: 2017-03-08

## 2017-03-08 ENCOUNTER — TELEPHONE (OUTPATIENT)
Dept: PHYSICAL THERAPY | Facility: HOSPITAL | Age: 22
End: 2017-03-08

## 2017-03-08 ENCOUNTER — HOSPITAL ENCOUNTER (OUTPATIENT)
Dept: PHYSICAL THERAPY | Facility: HOSPITAL | Age: 22
Setting detail: THERAPIES SERIES
End: 2017-03-08

## 2017-03-08 DIAGNOSIS — Q66.90 CONGENITAL DEFORMITY OF FOOT: Primary | ICD-10-CM

## 2017-03-08 NOTE — TELEPHONE ENCOUNTER
Pt reported to therapy this date to be seen. We had to cancel appt secondary to visits were not approved.  He was originally approved for the eval plus 6 and today would have made #8.  Explained to father and he understood.  Insurance info was sent off to receive more visits. Pt was scheduled for one time next week but is to call before coming to make sure they are approved. Father verbally demonstrated understanding to above.

## 2017-03-15 ENCOUNTER — APPOINTMENT (OUTPATIENT)
Dept: PHYSICAL THERAPY | Facility: HOSPITAL | Age: 22
End: 2017-03-15

## 2017-03-16 ENCOUNTER — HOSPITAL ENCOUNTER (OUTPATIENT)
Dept: PHYSICAL THERAPY | Facility: HOSPITAL | Age: 22
Setting detail: THERAPIES SERIES
Discharge: HOME | End: 2017-03-16

## 2017-03-16 DIAGNOSIS — M79.672 LEFT FOOT PAIN: Primary | ICD-10-CM

## 2017-03-16 PROCEDURE — 97110 THERAPEUTIC EXERCISES: CPT

## 2017-03-16 NOTE — PROGRESS NOTES
Outpatient Physical Therapy Ortho Treatment Note  [facility]     Patient Name: Artemio Lockhart  : 1995  MRN: [id number]  Today's Date: 3/16/2017      Visit Date: 2017     Subjective Improvement: 10%  MD visit: 2017  Visit Number: 7  Total Approved:9 total (only approved for 2 from 3/14/17-17)  Recert Date: 3/22/17      Visit Dx:    ICD-10-CM ICD-9-CM   1. Left foot pain M79.672 729.5       There is no problem list on file for this patient.       Past Medical History   Diagnosis Date   • Cancer    • Cellulitis    • Closed fracture of foot      Closed fracture of foot - History of   • Congenital anomaly of foot    • Congenital pes cavus    • Cradle cap      Cradle cap - History of   • Foot pain      Growth plate inflammation, left foot   • H/O leukemia    • Myopia    • Otitis media      Bilateral        Past Surgical History   Procedure Laterality Date   • Hernia repair     • Ankle surgery Left 2015     First Ankle surgery to correct cavovarus deformity/Second surgery 16             PT Ortho       17 0850    Posture/Observations    Posture/Observations Comments AFO L LE; good stride with gait.   -      User Key  (r) = Recorded By, (t) = Taken By, (c) = Cosigned By    Initials Name Provider Type    FRANCY Steele PTA Physical Therapy Assistant                            PT Assessment/Plan       17 0850       PT Assessment    Assessment Comments good tolerance to treatment,  No increase pain.  Balance improved and no LOB noted.   -     PT Plan    PT Frequency 2x/week  -     Predicted Duration of Therapy Intervention (days/wks) 4 weeks  -     PT Plan Comments Recheck next week.  Only one more visit approved after today.  Continue as advised.   -       User Key  (r) = Recorded By, (t) = Taken By, (c) = Cosigned By    Initials Name Provider Type    FRANCY Steele PTA Physical Therapy Assistant                    Exercises       17 0850        "   Subjective Comments    Subjective Comments Pt reports that DR. Osorio put him on a new medicine but unable to recall what it is. Takes it once a day and then his parents have to rub his foot.  Foot is about the same.   -KH      Subjective Pain    Able to rate subjective pain? yes  -KH      Pre-Treatment Pain Level 0  -KH      Post-Treatment Pain Level 0  -KH      Exercise 1    Exercise Name 1 Pro ll level 3  -KH      Time (Minutes) 1 10'  -KH      Exercise 2    Exercise Name 2 incline stretch  -KH      Sets 2 3  -KH      Time (Seconds) 2 30\" holds  -KH      Exercise 3    Exercise Name 3 step ups fwd/lat(6 inch step)  -KH      Sets 3 2  -KH      Reps 3 10  -KH      Exercise 4    Exercise Name 4 foam beam lateral stepping  -KH      Reps 4 5  -KH      Exercise 5    Exercise Name 5 foam beam heel-toe  -KH      Sets 5 5  -KH      Exercise 6    Exercise Name 6 airex CR/TR  -KH      Sets 6 2  -KH      Reps 6 10  -KH      Exercise 7    Exercise Name 7 airex mini squats  -KH      Sets 7 2  -KH      Reps 7 10  -KH        User Key  (r) = Recorded By, (t) = Taken By, (c) = Cosigned By    Initials Name Provider Type    FRANCY Steele, PTA Physical Therapy Assistant                               PT OP Goals       03/16/17 0850       PT Short Term Goals    STG Date to Achieve 03/15/17  -KH     STG 1 decrease L foot pain to 3/10  -KH     STG 1 Progress Not Met  -KH     STG 2 Increase AROM L foot by 3 degrees in PF and DF  -KH     STG 2 Progress Not Met  -     STG 3 Inc SLS L foot to 7 sec  -KH     STG 3 Progress Not Met  -     Long Term Goals    LTG Date to Achieve 03/22/17  -KH     LTG 1 ambulate in L AFO with minimal pain  -KH     LTG 1 Progress Progressing  -     LTG 2 decrease L hamstring and calf mm flexibility from min to WNL  -KH     LTG 2 Progress Not Met  -KH     LTG 3 Inc L foot DF and PF to 4/5 MS  -KH     LTG 3 Progress Not Met  -     LTG 4 Inc LEFS score to > or equal to 70/80  -     LTG 4 Progress Not Met  " -FRANCY     Time Calculation    PT Goal Re-Cert Due Date 03/22/17  -FRANCY       User Key  (r) = Recorded By, (t) = Taken By, (c) = Cosigned By    Initials Name Provider Type    FRANCY Steele PTA Physical Therapy Assistant                    Time Calculation:   Start Time: 0850  Stop Time: 0928  Time Calculation (min): 38 min  Total Timed Code Minutes- PT: 38 minute(s)    Therapy Charges for Today     Code Description Service Date Service Provider Modifiers Qty    [id number] HC PT THER PROC EA 15 MIN 3/16/2017 Letha Steele PTA GP 3                    Letha Steele PTA  3/16/2017

## 2017-03-21 ENCOUNTER — HOSPITAL ENCOUNTER (OUTPATIENT)
Dept: PHYSICAL THERAPY | Facility: HOSPITAL | Age: 22
Setting detail: THERAPIES SERIES
Discharge: HOME | End: 2017-03-21

## 2017-03-21 DIAGNOSIS — M79.672 LEFT FOOT PAIN: Primary | ICD-10-CM

## 2017-03-21 PROCEDURE — 97110 THERAPEUTIC EXERCISES: CPT | Performed by: PHYSICAL THERAPIST

## 2017-03-21 PROCEDURE — 97164 PT RE-EVAL EST PLAN CARE: CPT | Performed by: PHYSICAL THERAPIST

## 2017-03-21 PROCEDURE — 97140 MANUAL THERAPY 1/> REGIONS: CPT | Performed by: PHYSICAL THERAPIST

## 2017-03-21 NOTE — PROGRESS NOTES
Outpatient Physical Therapy Ortho Progress Note  [facility]     Patient Name: Artemio Lockhart  : 1995  MRN: [id number]  Today's Date: 3/21/2017      Visit Date: 2017     Visit   Recert date 17  10% improvement  MD visit 2017              There is no problem list on file for this patient.       Past Medical History   Diagnosis Date   • Cancer    • Cellulitis    • Closed fracture of foot      Closed fracture of foot - History of   • Congenital anomaly of foot    • Congenital pes cavus    • Cradle cap      Cradle cap - History of   • Foot pain      Growth plate inflammation, left foot   • H/O leukemia    • Myopia    • Otitis media      Bilateral        Past Surgical History   Procedure Laterality Date   • Hernia repair     • Ankle surgery Left 2015     First Ankle surgery to correct cavovarus deformity/Second surgery 16       Visit Dx:     ICD-10-CM ICD-9-CM   1. Left foot pain M79.672 729.5                 PT Ortho       17 1000    Subjective Pain    Able to rate subjective pain? yes  -KW    Pre-Treatment Pain Level 0  -KW    Post-Treatment Pain Level 0  -KW    Posture/Observations    Posture/Observations Comments good stride with gait in L AFO  -KW    Sensation    Additional Comments hypersensitive to touch lateral border L foot. reponds better to deed touch  -KW    ROM (Range of Motion)    General ROM Detail L foot DF 7, PF 30  -KW    MMT (Manual Muscle Testing)    General MMT Assessment Detail DF 4-/5, PF 3+/5  -KW    Lower Extremity Flexibility    Hamstrings Left:;Mildly limited  -KW    Gastrocnemius Left:;Mildly limited  -KW    Soleus Left:;Mildly limited  -KW    Balance Skills Training    SLS 7 sec  -KW      User Key  (r) = Recorded By, (t) = Taken By, (c) = Cosigned By    Initials Name Provider Type    LUISA Gusman, PT Physical Therapist                            Therapy Education       17 1050          Therapy Education    Given HEP  -KW       Program Reinforced  -KW      How Provided Verbal  -KW      Provided to Patient  -KW      Level of Understanding Verbalized  -KW        User Key  (r) = Recorded By, (t) = Taken By, (c) = Cosigned By    Initials Name Provider Type    LUISA Gusman PT Physical Therapist                PT OP Goals       03/21/17 1000       PT Short Term Goals    STG Date to Achieve 04/04/17  -KW     STG 1 decrease L foot pain to 2/10  -KW     STG 2 Increase AROM L foot by 3 degrees in PF and DF  -KW     STG 3 Inc SLS L foot to 10 sec  -KW     Long Term Goals    LTG Date to Achieve 04/11/17  -KW     LTG 1 ambulate in L AFO with minimal pain  -KW     LTG 2 decrease L hamstring and calf mm flexibility from min to WNL  -KW     LTG 3 Inc L foot DF and PF to 4/5 MS  -KW     LTG 4 Inc LEFS score to > or equal to 70/80  -KW     Time Calculation    PT Goal Re-Cert Due Date 04/11/17  -KW       User Key  (r) = Recorded By, (t) = Taken By, (c) = Cosigned By    Initials Name Provider Type    LUISA Gusman, PT Physical Therapist                PT Assessment/Plan       03/21/17 1133       PT Assessment    Assessment Comments tolerated ice well, also responds better to deep touch  -KW     PT Plan    PT Plan Comments cont PT for L foot balance and strength training  -KW       User Key  (r) = Recorded By, (t) = Taken By, (c) = Cosigned By    Initials Name Provider Type    LUISA Gusman PT Physical Therapist                Modalities       03/21/17 1000          Subjective Comments    Subjective Comments reports no change in hyper sensitivity of L foot  -KW      Ice    Ice Applied Yes  -KW      Location L foot dorsum/ lateral border  -KW      Rx Minutes 10 mins  -KW        User Key  (r) = Recorded By, (t) = Taken By, (c) = Cosigned By    Initials Name Provider Type    LUISA Gusman PT Physical Therapist              Exercises       03/21/17 1000          Subjective Comments    Subjective Comments reports no change in hyper  sensitivity of L foot  -KW      Subjective Pain    Able to rate subjective pain? yes  -KW      Pre-Treatment Pain Level 0  -KW      Post-Treatment Pain Level 0  -KW      Exercise 1    Exercise Name 1 ankle DF/ PF AROM  -KW      Sets 1 2  -KW      Reps 1 10  -KW      Exercise 2    Exercise Name 2 step ups  -KW      Sets 2 2  -KW      Reps 2 10  -KW      Exercise 3    Exercise Name 3 SL balance  -KW      Time (Minutes) 3 5 min  -KW        User Key  (r) = Recorded By, (t) = Taken By, (c) = Cosigned By    Initials Name Provider Type    LUISA Gusman, PT Physical Therapist           Manual Rx (last 36 hours)      Manual Treatments       03/21/17 1100          Manual Rx 1    Manual Rx 1 Location L foot  -KW      Manual Rx 1 Type STM / desensitization  -KW      Manual Rx 1 Duration 15 min  -KW        User Key  (r) = Recorded By, (t) = Taken By, (c) = Cosigned By    Initials Name Provider Type    LUISA Gusman, PT Physical Therapist                            Outcome Measures       03/21/17 1100          Lower Extremity Functional Index    Any of your usual work, housework or school activities 2  -KW      Your usual hobbies, recreational or sporting activities 0  -KW      Getting into or out of the bath 1  -KW      Walking between rooms 1  -KW      Putting on your shoes or socks 1  -KW      Squatting 0  -KW      Lifting an object, like a bag of groceries from the floor 4  -KW      Performing light activities around your home 3  -KW      Performing heavy activities around your home 0  -KW      Getting into or out of a car 1  -KW      Walking 2 blocks 1  -KW      Walking a mile 4  -KW      Going up or down 10 stairs (about 1 flight of stairs) 0  -KW      Standing for 1 hour 4  -KW      Sitting for 1 hour 0  -KW      Running on even ground 0  -KW      Running on uneven ground 0  -KW      Making sharp turns while running fast 0  -KW      Hopping 0  -KW      Rolling over in bed 4  -KW      Total 26  -KW       Functional Assessment    Outcome Measure Options Lower Extremity Functional Scale (LEFS)  -LUISA        User Key  (r) = Recorded By, (t) = Taken By, (c) = Cosigned By    Initials Name Provider Type    LUISA Gusman, PT Physical Therapist            Time Calculation:   Start Time: 1100  Stop Time: 1150  Time Calculation (min): 50 min  Total Timed Code Minutes- PT: 30 minute(s)     Therapy Charges for Today     Code Description Service Date Service Provider Modifiers Qty    61731136866 HC PT MANUAL THERAPY EA 15 MIN 3/21/2017 Sonaj Gusman, PT GP 1    74739538850 HC PT THER PROC EA 15 MIN 3/21/2017 Sonja Gusman, PT GP 1    08615112777 HC PT RE-EVAL ESTABLISHED PLAN 2 3/21/2017 Sonja Gusman, PT GP 1          PT G-Codes  Outcome Measure Options: Lower Extremity Functional Scale (LEFS)         Sonja Gusman, PT  3/21/2017

## 2017-04-06 ENCOUNTER — APPOINTMENT (OUTPATIENT)
Dept: PHYSICAL THERAPY | Facility: HOSPITAL | Age: 22
End: 2017-04-06

## 2017-04-18 ENCOUNTER — HOSPITAL ENCOUNTER (OUTPATIENT)
Dept: PHYSICAL THERAPY | Facility: HOSPITAL | Age: 22
Setting detail: THERAPIES SERIES
Discharge: HOME | End: 2017-04-18

## 2017-04-18 DIAGNOSIS — M79.672 LEFT FOOT PAIN: Primary | ICD-10-CM

## 2017-04-18 PROCEDURE — 97110 THERAPEUTIC EXERCISES: CPT | Performed by: PHYSICAL THERAPIST

## 2017-04-18 PROCEDURE — 97113 AQUATIC THERAPY/EXERCISES: CPT | Performed by: PHYSICAL THERAPIST

## 2017-04-18 NOTE — PROGRESS NOTES
Outpatient Physical Therapy Ortho Progress Note  [facility]     Patient Name: Artemio Lockhart  : 1995  MRN: [id number]  Today's Date: 2017      Visit Date: 2017      Visit 9/10  Recert date 17  MD visit 2017  15% improvement                Visit Dx:    ICD-10-CM ICD-9-CM   1. Left foot pain M79.672 729.5       There is no problem list on file for this patient.       Past Medical History:   Diagnosis Date   • Cancer    • Cellulitis    • Closed fracture of foot     Closed fracture of foot - History of   • Congenital anomaly of foot    • Congenital pes cavus    • Cradle cap     Cradle cap - History of   • Foot pain     Growth plate inflammation, left foot   • H/O leukemia    • Myopia    • Otitis media     Bilateral        Past Surgical History:   Procedure Laterality Date   • ANKLE SURGERY Left 2015    First Ankle surgery to correct cavovarus deformity/Second surgery 16   • HERNIA REPAIR               PT Ortho       17 0800    ROM (Range of Motion)    General ROM Detail L foot DF 8, PF 33, Inv 20, ER PROM 3  -KW    MMT (Manual Muscle Testing)    General MMT Assessment Detail DF and PF 4-/5  -KW    Lower Extremity Flexibility    Hamstrings Bilateral:;Mildly limited  -KW    Gastrocnemius Bilateral:;Mildly limited  -KW    Soleus Bilateral:;Mildly limited  -KW      User Key  (r) = Recorded By, (t) = Taken By, (c) = Cosigned By    Initials Name Provider Type    KW Sonja Gusman, PT Physical Therapist                            PT Assessment/Plan       17 0845       PT Assessment    Functional Limitations Impaired gait;Impaired locomotion;Limitation in home management;Performance in leisure activities  -KW     Impairments Balance;Muscle strength;Pain;Range of motion;Gait;Endurance  -KW     Assessment Comments tolerates pool well  -KW     Please refer to paper survey for additional self-reported information No  -KW     Rehab Potential Good  -KW      Patient/caregiver participated in establishment of treatment plan and goals Yes  -KW     Patient would benefit from skilled therapy intervention Yes  -KW     PT Plan    PT Frequency 2x/week  -KW     Predicted Duration of Therapy Intervention (days/wks) 3-4 weeks  -KW     Planned CPT's? PT THER ACT EA 15 MIN: 25704;PT THER SUPP EA 15 MIN;PT MANUAL THERAPY EA 15 MIN: 22279;PT AQUATIC THERAPY EA 15 MIN: 66671;PT GAIT TRAINING EA 15 MIN: 10325;PT THER PROC EA 15 MIN: 41353;PT RE-EVAL: 87039  -KW     Physical Therapy Interventions (Optional Details) aquatics exercise;balance training;ROM (Range of Motion);stair training;strengthening;stretching;manual therapy techniques;gait training;home exercise program;joint mobilization  -KW     PT Plan Comments cont PT for L foot balance and strength training  -KW       User Key  (r) = Recorded By, (t) = Taken By, (c) = Cosigned By    Initials Name Provider Type    LUISA Gusman, PT Physical Therapist                    Exercises       04/18/17 0800          Subjective Comments    Subjective Comments Reports has not been here for the last  month secondary to the facility calling to cancel him. Reports no change in pain L feet  -KW      Subjective Pain    Able to rate subjective pain? yes  -KW      Pre-Treatment Pain Level 0  -KW      Aquatics    Aquatics performed? Yes  -KW      Aquatics LE    Water Walk forward;side;backward  -KW      Hip Abd/Add 5 min  -KW      Hip Flex/Ext 5 min  -KW      Mini Squat 30x  -KW      Toe/Heel Raises 30x  -KW      Exercise 1    Exercise Name 1 toe raises  -KW      Sets 1 2  -KW      Reps 1 10  -KW      Exercise 2    Exercise Name 2 heel raises  -KW      Sets 2 2  -KW      Reps 2 10  -KW      Exercise 3    Exercise Name 3 L SLS  -KW      Sets 3 3  -KW      Time (Seconds) 3 10   -KW        User Key  (r) = Recorded By, (t) = Taken By, (c) = Cosigned By    Initials Name Provider Type    LUISA Gusman PT Physical Therapist                                PT OP Goals       04/18/17 0700       PT Short Term Goals    STG Date to Achieve 05/02/17  -KW     STG 1 decrease L foot pain to 0/10  -KW     STG 1 Progress --  -KW     STG 2 Increase AROM L foot by 3 degrees in PF and DF  -KW     STG 2 Progress --  -KW     STG 3 Inc SLS L foot to 10 sec  -KW     STG 3 Progress --  -KW     Long Term Goals    LTG Date to Achieve 05/09/17  -KW     LTG 1 ambulate in L AFO with minimal pain  -KW     LTG 1 Progress --  -KW     LTG 2 decrease L hamstring and calf mm flexibility from min to WNL  -KW     LTG 2 Progress --  -KW     LTG 3 Inc L foot DF and PF to 4/5 MS  -KW     LTG 3 Progress --  -KW     LTG 4 Inc LEFS score to > or equal to 70/80  -KW     LTG 4 Progress --  -KW     Time Calculation    PT Goal Re-Cert Due Date 05/09/17  -KW       User Key  (r) = Recorded By, (t) = Taken By, (c) = Cosigned By    Initials Name Provider Type    LUISA Gusman, CURTIS Physical Therapist                Therapy Education       04/18/17 0757          Therapy Education    Given HEP  -KW      Program Reinforced  -KW      How Provided Verbal  -KW      Provided to Patient  -KW      Level of Understanding Verbalized  -KW        User Key  (r) = Recorded By, (t) = Taken By, (c) = Cosigned By    Initials Name Provider Type    LUISA Gusman, CURTIS Physical Therapist                Time Calculation:   Start Time: 0800  Stop Time: 0845  Time Calculation (min): 45 min  Total Timed Code Minutes- PT: 45 minute(s)    Therapy Charges for Today     Code Description Service Date Service Provider Modifiers Qty    46089177548 HC PT AQUATIC THERAPY EA 15 MIN 4/18/2017 Sonja Gusman, PT GP 2    16508234481 HC PT THER PROC EA 15 MIN 4/18/2017 Sonja Gusman, PT GP 1                    Sonja Gusman, PT  4/18/2017

## 2017-04-21 ENCOUNTER — HOSPITAL ENCOUNTER (OUTPATIENT)
Dept: PHYSICAL THERAPY | Facility: HOSPITAL | Age: 22
Setting detail: THERAPIES SERIES
Discharge: HOME | End: 2017-04-21

## 2017-04-21 DIAGNOSIS — M79.672 LEFT FOOT PAIN: Primary | ICD-10-CM

## 2017-04-21 PROCEDURE — 97110 THERAPEUTIC EXERCISES: CPT

## 2017-04-21 NOTE — PROGRESS NOTES
Outpatient Physical Therapy Ortho Treatment Note  [facility]     Patient Name: Artemio Lockhart  : 1995  MRN: [id number]  Today's Date: 2017      Visit Date: 2017     Subjective Improvement: 10%   Visits Attended: 10/11 (3 visits left after today)   Visits Approved 5 visits from -5/10/17   MD visit: 2017   Recert Date: 17           Visit Dx:    ICD-10-CM ICD-9-CM   1. Left foot pain M79.672 729.5       There is no problem list on file for this patient.       Past Medical History:   Diagnosis Date   • Cancer    • Cellulitis    • Closed fracture of foot     Closed fracture of foot - History of   • Congenital anomaly of foot    • Congenital pes cavus    • Cradle cap     Cradle cap - History of   • Foot pain     Growth plate inflammation, left foot   • H/O leukemia    • Myopia    • Otitis media     Bilateral        Past Surgical History:   Procedure Laterality Date   • ANKLE SURGERY Left 2015    First Ankle surgery to correct cavovarus deformity/Second surgery 16   • HERNIA REPAIR               PT Ortho       17 0800    Subjective Pain    Post-Treatment Pain Level 0  -    Posture/Observations    Posture/Observations Comments good stride w/ gait; no antalgia noted; AFO L LE  -    Sensation    Additional Comments hypersensitive left lateral foot  -      User Key  (r) = Recorded By, (t) = Taken By, (c) = Cosigned By    Initials Name Provider Type    FRANCY Steele PTA Physical Therapy Assistant                            PT Assessment/Plan       17 0800       PT Assessment    Assessment Comments good tolerance to fluido and increased WB activites this date.  Ramp challengening for pt but tolerated without pain.  -     PT Plan    PT Frequency 2x/week  -     Predicted Duration of Therapy Intervention (days/wks) 3-4 weeks  -     PT Plan Comments Continue PT to increase strength in the L foot  Pt to do desenization at home.   -       User Key  " (r) = Recorded By, (t) = Taken By, (c) = Cosigned By    Initials Name Provider Type    FRANCY Steele PTA Physical Therapy Assistant                    Exercises       04/21/17 0800          Subjective Comments    Subjective Comments Able to walk at home without brace without pain.  Unless something hits it.  Little toe is the most sensitive. Reports no new c/o's at this time.  Thought he might slip on the wet ground this morning.  -KH      Subjective Pain    Able to rate subjective pain? yes  -KH      Pre-Treatment Pain Level 0  -KH      Post-Treatment Pain Level 0  -KH      Exercise 1    Exercise Name 1 pro ll level 3  -KH      Time (Minutes) 1 10'  -KH      Exercise 2    Exercise Name 2 incline stretch  -KH      Sets 2 3  -KH      Time (Seconds) 2 30\"  -KH      Exercise 3    Exercise Name 3 L SLS   -KH      Sets 3 3  -KH      Time (Minutes) 3 w/ finger tip tap down  -KH      Time (Seconds) 3 15\"  -KH      Exercise 4    Exercise Name 4 step ups fwd/lat  -KH      Sets 4 3  -KH      Reps 4 10  -KH      Time (Minutes) 4 6 inch  -KH      Exercise 5    Exercise Name 5 ramp up/down fwd  -KH      Sets 5 5  -KH      Exercise 6    Exercise Name 6 ramp up bkd/down fwd  -KH      Sets 6 5  -KH      Exercise 7    Exercise Name 7 cybex leg press  -KH      Sets 7 3  -KH      Reps 7 10  -KH      Time (Minutes) 7 75#  -KH      Exercise 8    Exercise Name 8 fluidotherapy  L foot for desenitization  -KH      Time (Minutes) 8 10'  -KH      Time (Seconds) 8 w/ AROM L foot  -KH        User Key  (r) = Recorded By, (t) = Taken By, (c) = Cosigned By    Initials Name Provider Type    FRANCY Steele PTA Physical Therapy Assistant                               PT OP Goals       04/21/17 0800       PT Short Term Goals    STG Date to Achieve 05/02/17  -KH     STG 1 decrease L foot pain to 0/10  -KH     STG 1 Progress Met  -KH     STG 2 Increase AROM L foot by 3 degrees in PF and DF  -KH     STG 2 Progress Not Met  -KH     STG 3 Inc SLS L foot " to 10 sec  -     STG 3 Progress Progressing  -     Long Term Goals    LTG Date to Achieve 05/09/17  -     LTG 1 ambulate in L AFO with minimal pain  -     LTG 1 Progress Met  -     LTG 2 decrease L hamstring and calf mm flexibility from min to WNL  -     LTG 2 Progress Not Met  -     LTG 3 Inc L foot DF and PF to 4/5 MS  -     LTG 3 Progress Not Met  -     LTG 4 Inc LEFS score to > or equal to 70/80  -     LTG 4 Progress Not Met  -     Time Calculation    PT Goal Re-Cert Due Date 05/09/17  -       User Key  (r) = Recorded By, (t) = Taken By, (c) = Cosigned By    Initials Name Provider Type    FRANCY Steele PTA Physical Therapy Assistant                    Time Calculation:   Start Time: 0800  Stop Time: 0853  Time Calculation (min): 53 min  Total Timed Code Minutes- PT: 53 minute(s)    Therapy Charges for Today     Code Description Service Date Service Provider Modifiers Qty    15746121789 HC PT THER PROC EA 15 MIN 4/21/2017 Letha Steele PTA GP 4                    Letha Steele PTA  4/21/2017

## 2017-04-24 ENCOUNTER — HOSPITAL ENCOUNTER (OUTPATIENT)
Dept: PHYSICAL THERAPY | Facility: HOSPITAL | Age: 22
Setting detail: THERAPIES SERIES
Discharge: HOME | End: 2017-04-24

## 2017-04-24 DIAGNOSIS — M79.672 LEFT FOOT PAIN: Primary | ICD-10-CM

## 2017-04-24 PROCEDURE — 97110 THERAPEUTIC EXERCISES: CPT

## 2017-04-24 NOTE — PROGRESS NOTES
Outpatient Physical Therapy Ortho Treatment Note  [facility]     Patient Name: Artemio Lockhart  : 1995  MRN: [id number]  Today's Date: 2017      Visit Date: 2017  Pt. Has attended 11/12 visits  Pt. Approved for 2 more visits from  through -10  Md 2017  Recert 17  Visit Dx:    ICD-10-CM ICD-9-CM   1. Left foot pain M79.672 729.5       There is no problem list on file for this patient.       Past Medical History:   Diagnosis Date   • Cancer    • Cellulitis    • Closed fracture of foot     Closed fracture of foot - History of   • Congenital anomaly of foot    • Congenital pes cavus    • Cradle cap     Cradle cap - History of   • Foot pain     Growth plate inflammation, left foot   • H/O leukemia    • Myopia    • Otitis media     Bilateral        Past Surgical History:   Procedure Laterality Date   • ANKLE SURGERY Left 2015    First Ankle surgery to correct cavovarus deformity/Second surgery 16   • HERNIA REPAIR                               PT Assessment/Plan       17 0910       PT Assessment    Assessment Comments Tolerates CKC work well and remains sensitive on scar and tolerates fluidotherapy well but very sensitivewith wash cloth to clean foot off after fluido  -SP     PT Plan    PT Frequency 2x/week  -SP     PT Plan Comments Continue with POC resume pool was unable to compete pool this date as pool was down for maintanence.  -SP       User Key  (r) = Recorded By, (t) = Taken By, (c) = Cosigned By    Initials Name Provider Type    SP June Hills PTA Physical Therapy Assistant                    Exercises       17 0800          Subjective Comments    Subjective Comments Continues to ambulate without brace at home wears it outside   -SP      Subjective Pain    Able to rate subjective pain? yes  -SP      Pre-Treatment Pain Level 0  -SP      Post-Treatment Pain Level 0  -SP      Exercise 1    Exercise Name 1 Pro 2 Level3  -SP      Time  (Minutes) 1 10 min  -SP      Exercise 2    Exercise Name 2 Incline S  -SP      Sets 2 3  -SP      Time (Seconds) 2 30 min  -SP      Exercise 3    Exercise Name 3 step ups 6 inch  -SP      Sets 3 2  -SP      Reps 3 10  -SP      Exercise 4    Exercise Name 4 airex sl with R toe touch with finger sweep  -SP      Reps 4 20  -SP      Exercise 5    Exercise Name 5 airex minisquats  -SP      Reps 5 20  -SP      Exercise 6    Exercise Name 6 leg press   -SP      Weights/Plates 6 --   75 pounds  -SP      Reps 6 25  -SP      Exercise 7    Exercise Name 7 ramp walk fwd /back  -SP      Exercise 8    Exercise Name 8 fluidotherapy with AROM L Le   -SP      Time (Minutes) 8 10 min  -SP        User Key  (r) = Recorded By, (t) = Taken By, (c) = Cosigned By    Initials Name Provider Type    JANEY Hills PTA Physical Therapy Assistant                               PT OP Goals       04/24/17 0913 04/24/17 0900    PT Short Term Goals    STG Date to Achieve  05/02/17  -SP    STG 1  decrease L foot pain to 0/10  -SP    STG 1 Progress  Met  -SP    STG 2  Increase AROM L foot by 3 degrees in PF and DF  -SP    STG 2 Progress  Progressing  -SP    STG 3  Inc SLS L foot to 10 sec  -SP    STG 3 Progress  Progressing  -SP    Long Term Goals    LTG Date to Achieve  05/09/17  -SP    LTG 1  ambulate in L AFO with minimal pain  -SP    LTG 1 Progress  Met  -SP    LTG 2  decrease L hamstring and calf mm flexibility from min to WNL  -SP    LTG 2 Progress  Not Met  -SP    LTG 3  Inc L foot DF and PF to 4/5 MS  -SP    LTG 3 Progress  Not Met  -SP    LTG 4  Inc LEFS score to > or equal to 70/80  -SP    LTG 4 Progress  Not Met  -SP    Time Calculation    PT Goal Re-Cert Due Date 05/10/17  -SP 05/09/17  -SP      User Key  (r) = Recorded By, (t) = Taken By, (c) = Cosigned By    Initials Name Provider Type    JANEY Hills PTA Physical Therapy Assistant                    Time Calculation:   Start Time: 0810  Stop Time: 0910  Time Calculation  (min): 60 min  Total Timed Code Minutes- PT: 60 minute(s)    Therapy Charges for Today     Code Description Service Date Service Provider Modifiers Qty    39135149713 HC PT THER PROC EA 15 MIN 4/24/2017 June Hills, PTA GP 4                    June Hills, DEL  4/24/2017

## 2017-04-26 ENCOUNTER — HOSPITAL ENCOUNTER (OUTPATIENT)
Dept: PHYSICAL THERAPY | Facility: HOSPITAL | Age: 22
Setting detail: THERAPIES SERIES
Discharge: HOME | End: 2017-04-26

## 2017-04-26 DIAGNOSIS — M79.672 LEFT FOOT PAIN: Primary | ICD-10-CM

## 2017-04-26 PROCEDURE — 97110 THERAPEUTIC EXERCISES: CPT

## 2017-04-26 NOTE — PROGRESS NOTES
Outpatient Physical Therapy Ortho Treatment Note  [facility]     Patient Name: Artemio Lockhart  : 1995  MRN: [id number]  Today's Date: 2017      Visit Date: 2017  Pt. Has attended 12/ visits one more approved   MD in 2017  Recert 17  Unsure of subjective improvement  Visit Dx:    ICD-10-CM ICD-9-CM   1. Left foot pain M79.672 729.5       There is no problem list on file for this patient.       Past Medical History:   Diagnosis Date   • Cancer    • Cellulitis    • Closed fracture of foot     Closed fracture of foot - History of   • Congenital anomaly of foot    • Congenital pes cavus    • Cradle cap     Cradle cap - History of   • Foot pain     Growth plate inflammation, left foot   • H/O leukemia    • Myopia    • Otitis media     Bilateral        Past Surgical History:   Procedure Laterality Date   • ANKLE SURGERY Left 2015    First Ankle surgery to correct cavovarus deformity/Second surgery 16   • HERNIA REPAIR                               PT Assessment/Plan       17 0853       PT Assessment    Assessment Comments Tolerates Rx well ROM for PF met progressing with DF goal remains TTP about scar  -SP     PT Plan    PT Frequency 2x/week  -SP     PT Plan Comments visit SL stance for goal and possible continuance of POC but will need additional visits approved.  Has one remaining visit approved  -SP       User Key  (r) = Recorded By, (t) = Taken By, (c) = Cosigned By    Initials Name Provider Type    JANEY Hills PTA Physical Therapy Assistant                    Exercises       17 0700          Subjective Comments    Subjective Comments Tired today has trouble going to sleep  -SP      Subjective Pain    Able to rate subjective pain? yes  -SP      Pre-Treatment Pain Level 0  -SP      Post-Treatment Pain Level 0  -SP      Aquatics    Aquatics performed? No  -SP      Exercise 1    Exercise Name 1 Pro 2 seat Level 4  -SP      Time (Minutes) 1 10  min  -SP      Exercise 2    Exercise Name 2 Incline S Hs S  -SP      Sets 2 3  -SP      Time (Seconds) 2 30 sec  -SP      Exercise 3    Exercise Name 3 leg press   -SP      Weights/Plates 3 --   75 pounds  -SP      Reps 3 30  -SP      Exercise 4    Exercise Name 4 hip abd/add   -SP      Weights/Plates 4 --   30 pounds  -SP      Reps 4 20  -SP      Exercise 5    Exercise Name 5 ramp walk retro  -SP      Reps 5 3  -SP      Exercise 6    Exercise Name 6 Fluidotherapy with AROM  -SP      Time (Minutes) 6 10 min  -SP      Exercise 7    Exercise Name 7 ROM Measures  -SP        User Key  (r) = Recorded By, (t) = Taken By, (c) = Cosigned By    Initials Name Provider Type    JANEY Hills PTA Physical Therapy Assistant                               PT OP Goals       04/26/17 0902 04/26/17 0800    PT Short Term Goals    STG Date to Achieve  05/02/17  -SP    STG 1  decrease L foot pain to 0/10  -SP    STG 1 Progress  Met  -SP    STG 2  Increase AROM L foot by 3 degrees in PF and DF  -SP    STG 2 Progress  Partially Met  -SP    STG 2 Progress Comments  DF 12 degrees PF25 degrees  -SP    STG 3  Inc SLS L foot to 10 sec  -SP    STG 3 Progress  Progressing  -SP    Long Term Goals    LTG Date to Achieve  05/09/17  -SP    LTG 1  ambulate in L AFO with minimal pain  -SP    LTG 1 Progress  Met  -SP    LTG 2  decrease L hamstring and calf mm flexibility from min to WNL  -SP    LTG 2 Progress  Not Met  -SP    LTG 3  Inc L foot DF and PF to 4/5 MS  -SP    LTG 3 Progress  Met  -SP    LTG 3 Progress Comments  PF DF 4/5  -SP    LTG 4  Inc LEFS score to > or equal to 70/80  -SP    LTG 4 Progress  Not Met  -SP    Time Calculation    PT Goal Re-Cert Due Date 05/09/17  -SP 05/10/17  -SP      User Key  (r) = Recorded By, (t) = Taken By, (c) = Cosigned By    Initials Name Provider Type    JANEY Hills PTA Physical Therapy Assistant                    Time Calculation:   Start Time: 0750  Stop Time: 0850  Time Calculation (min): 60  min  Total Timed Code Minutes- PT: 60 minute(s)    Therapy Charges for Today     Code Description Service Date Service Provider Modifiers Qty    31045198097 HC PT THER PROC EA 15 MIN 4/26/2017 June Hills PTA GP 4                    June Hills PTA  4/26/2017

## 2017-05-01 ENCOUNTER — HOSPITAL ENCOUNTER (OUTPATIENT)
Dept: PHYSICAL THERAPY | Facility: HOSPITAL | Age: 22
Setting detail: THERAPIES SERIES
Discharge: HOME | End: 2017-05-01

## 2017-05-01 DIAGNOSIS — M79.672 LEFT FOOT PAIN: Primary | ICD-10-CM

## 2017-05-01 PROCEDURE — 97110 THERAPEUTIC EXERCISES: CPT

## 2017-05-08 ENCOUNTER — HOSPITAL ENCOUNTER (OUTPATIENT)
Dept: PHYSICAL THERAPY | Facility: HOSPITAL | Age: 22
Setting detail: THERAPIES SERIES
Discharge: HOME | End: 2017-05-08

## 2017-05-08 DIAGNOSIS — M79.672 LEFT FOOT PAIN: Primary | ICD-10-CM

## 2017-05-08 PROCEDURE — 97110 THERAPEUTIC EXERCISES: CPT | Performed by: PHYSICAL THERAPIST

## 2017-05-08 PROCEDURE — 97530 THERAPEUTIC ACTIVITIES: CPT | Performed by: PHYSICAL THERAPIST

## 2017-06-07 ENCOUNTER — HOSPITAL ENCOUNTER (OUTPATIENT)
Dept: PHYSICAL THERAPY | Facility: HOSPITAL | Age: 22
Setting detail: THERAPIES SERIES
Discharge: HOME | End: 2017-06-07

## 2017-06-07 DIAGNOSIS — M79.672 LEFT FOOT PAIN: Primary | ICD-10-CM

## 2017-06-07 PROCEDURE — 97113 AQUATIC THERAPY/EXERCISES: CPT | Performed by: PHYSICAL THERAPIST

## 2017-06-07 PROCEDURE — 97110 THERAPEUTIC EXERCISES: CPT | Performed by: PHYSICAL THERAPIST

## 2017-06-07 NOTE — THERAPY PROGRESS REPORT/RE-CERT
Outpatient Physical Therapy Ortho Progress Note  [facility]     Patient Name: Artemio Lockhart  : 1995  MRN: [id number]  Today's Date: 2017      Visit Date: 2017      Visit  6v approved from 17 to 17  MD LUTZ  20% improvement  Recert date 17            Visit Dx:    ICD-10-CM ICD-9-CM   1. Left foot pain M79.672 729.5       There is no problem list on file for this patient.       Past Medical History:   Diagnosis Date   • Cancer    • Cellulitis    • Closed fracture of foot     Closed fracture of foot - History of   • Congenital anomaly of foot    • Congenital pes cavus    • Cradle cap     Cradle cap - History of   • Foot pain     Growth plate inflammation, left foot   • H/O leukemia    • Myopia    • Otitis media     Bilateral        Past Surgical History:   Procedure Laterality Date   • ANKLE SURGERY Left 2015    First Ankle surgery to correct cavovarus deformity/Second surgery 16   • HERNIA REPAIR               PT Ortho       17 1200    Subjective Comments    Subjective Comments Reports 5/10 pain in L foot. States overall since the start of PT in 2017/ L foot is better  -KW    Precautions and Contraindications    Precautions WBAT in L AFO  -KW    Contraindications no restrctions  -KW    Subjective Pain    Able to rate subjective pain? yes  -KW    Pre-Treatment Pain Level 5  -KW    Post-Treatment Pain Level 0  -KW    Posture/Observations    Posture/Observations Comments non antalgic gait  -KW    Sensation    Additional Comments hypersensitive to L foot lateral border  -KW    MMT (Manual Muscle Testing)    General MMT Assessment Detail DF and PF MMT 4-/5  -KW    Lower Extremity Flexibility    Hamstrings Left:;Moderately limited  -KW    Gastrocnemius Left:;Moderately limited  -KW    Balance Skills Training    SLS 8  -KW      User Key  (r) = Recorded By, (t) = Taken By, (c) = Cosigned By    Initials Name Provider Type    LUISA Gusman, PT  Physical Therapist                            PT Assessment/Plan       06/07/17 1205 06/07/17 1124    PT Assessment    Functional Limitations Impaired gait;Impaired locomotion;Limitations in community activities;Performance in leisure activities  -KW Impaired gait;Impaired locomotion;Limitations in community activities;Performance in leisure activities  -KW    Impairments Balance;Dexterity;Cognition;Range of motion;Joint integrity;Joint mobility;Pain;Muscle strength;Gait;Endurance  -KW Gait;Range of motion;Joint integrity;Joint mobility  -KW    Assessment Comments good tolerance today. little pain during ther ex  -KW     Please refer to paper survey for additional self-reported information No  -KW     Rehab Potential Good  -KW     Patient/caregiver participated in establishment of treatment plan and goals Yes  -KW     Patient would benefit from skilled therapy intervention Yes  -KW     PT Plan    PT Frequency 2x/week  -KW     Predicted Duration of Therapy Intervention (days/wks) 4 weeks  -KW     Planned CPT's? PT RE-EVAL: 16082;PT THER PROC EA 15 MIN: 95039;PT THER ACT EA 15 MIN: 39184;PT AQUATIC THERAPY EA 15 MIN: 46837;PT THER SUPP EA 15 MIN;PT MANUAL THERAPY EA 15 MIN: 87103;PT GAIT TRAINING EA 15 MIN: 10864  -KW     Physical Therapy Interventions (Optional Details) aquatics exercise;ROM (Range of Motion);stair training;strengthening;stretching;modalities;manual therapy techniques;balance training;swiss ball techniques;gait training;home exercise program;patient/family education  -KW     PT Plan Comments PT 2x per week for L foot pain. work on strength and balance  -       User Key  (r) = Recorded By, (t) = Taken By, (c) = Cosigned By    Initials Name Provider Type    KW Sonja Gusman, PT Physical Therapist                    Exercises       06/07/17 1200 06/07/17 1100       Subjective Comments    Subjective Comments Reports 5/10 pain in L foot. States overall since the start of PT in Feb 2017/ L foot is  better  -KW      Subjective Pain    Able to rate subjective pain? yes  -KW      Pre-Treatment Pain Level 5  -KW      Post-Treatment Pain Level 0  -KW      Aquatics    Aquatics performed?  Yes  -KW     Aquatics LE    Water Walk  forward;side;backward  -KW     Hip Abd/Add  30x  -KW     Hip Flex/Ext  30x  -KW     Mini Squat  30x  -KW     Toe/Heel Raises  30x  -KW     Exercise 1    Exercise Name 1  toe  raises  -KW     Sets 1  3  -KW     Reps 1  10  -KW     Exercise 2    Exercise Name 2  stretch foot and hamstring  -KW     Sets 2  3  -KW     Time (Seconds) 2  30  -KW     Exercise 3    Exercise Name 3  SLS  -KW     Sets 3  3  -KW     Time (Seconds) 3  10  -KW     Exercise 4    Exercise Name 4  bridges  -KW     Sets 4  3  -KW     Reps 4  10  -KW     Exercise 5    Exercise Name 5  L ankle ABCs  -KW     Time (Minutes) 5  3  -KW       User Key  (r) = Recorded By, (t) = Taken By, (c) = Cosigned By    Initials Name Provider Type    KW Sonja Gusman, PT Physical Therapist                               PT OP Goals       06/07/17 1100       PT Short Term Goals    STG Date to Achieve 06/22/17  -KW     STG 1 decrease L foot pain to 0/10  -KW     STG 1 Progress Met  -KW     STG 2 perform 3 sets of 10 mini squats with L AFO pain free  -KW     STG 2 Progress New  -KW     STG 3 Inc SLS L foot to 10 sec  -KW     STG 3 Progress Not Met  -KW     Long Term Goals    LTG Date to Achieve 06/30/17  -KW     LTG 1 Ind w/ final HEP  -KW     LTG 1 Progress Met  -KW     LTG 2 decrease L hamstring and calf mm flexibility from min to WNL  -KW     LTG 2 Progress Not Met  -KW     LTG 3  perform step ups for 3 sets of 10 in L AFO pain free  -KW     LTG 3 Progress New  -KW     LTG 4 Inc LEFS score to > or equal to 70/80  -KW     LTG 4 Progress Not Met  -KW     Time Calculation    PT Goal Re-Cert Due Date 06/30/17  -KW       User Key  (r) = Recorded By, (t) = Taken By, (c) = Cosigned By    Initials Name Provider Type    KW Sonja Gusman, PT Physical  Therapist                Therapy Education       06/07/17 1122          Therapy Education    Given HEP  -KW      Program Reinforced  -KW      How Provided Demonstration  -KW      Provided to Patient  -KW      Level of Understanding Demonstrated  -KW        User Key  (r) = Recorded By, (t) = Taken By, (c) = Cosigned By    Initials Name Provider Type    LUISA Gusman, PT Physical Therapist                Time Calculation:   Start Time: 1100  Stop Time: 1154  Time Calculation (min): 54 min  Total Timed Code Minutes- PT: 54 minute(s)    Therapy Charges for Today     Code Description Service Date Service Provider Modifiers Qty    44456410114 HC PT AQUATIC THERAPY EA 15 MIN 6/7/2017 Sonja Gusman, PT GP 2    08225718218 HC PT THER PROC EA 15 MIN 6/7/2017 Sonja Gusman, PT GP 2                    Sonja Gusman, PT  6/7/2017

## 2017-06-13 ENCOUNTER — HOSPITAL ENCOUNTER (OUTPATIENT)
Dept: PHYSICAL THERAPY | Facility: HOSPITAL | Age: 22
Setting detail: THERAPIES SERIES
Discharge: HOME | End: 2017-06-13

## 2017-06-13 DIAGNOSIS — M79.672 LEFT FOOT PAIN: Primary | ICD-10-CM

## 2017-06-13 PROCEDURE — 97110 THERAPEUTIC EXERCISES: CPT | Performed by: PHYSICAL THERAPY ASSISTANT

## 2017-06-13 NOTE — THERAPY TREATMENT NOTE
Outpatient Physical Therapy Ortho Treatment Note  [facility]     Patient Name: Artemio Lockhart  : 1995  MRN: [id number]  Today's Date: 2017      Visit Date: 2017    Visits    Recert Date 17   MD appointment    Pt reports  20% improvement       Visit Dx:    ICD-10-CM ICD-9-CM   1. Left foot pain M79.672 729.5       There is no problem list on file for this patient.       Past Medical History:   Diagnosis Date   • Cancer    • Cellulitis    • Closed fracture of foot     Closed fracture of foot - History of   • Congenital anomaly of foot    • Congenital pes cavus    • Cradle cap     Cradle cap - History of   • Foot pain     Growth plate inflammation, left foot   • H/O leukemia    • Myopia    • Otitis media     Bilateral        Past Surgical History:   Procedure Laterality Date   • ANKLE SURGERY Left 2015    First Ankle surgery to correct cavovarus deformity/Second surgery 16   • HERNIA REPAIR                               PT Assessment/Plan       17 1100       PT Assessment    Assessment Comments good tolerance with ther ex, no new goals met but progressing well.  -     PT Plan    PT Frequency 2x/week  -     PT Plan Comments 3sets of 10 MS  -       User Key  (r) = Recorded By, (t) = Taken By, (c) = Cosigned By    Initials Name Provider Type     Flavio Nazario PTA Physical Therapy Assistant                    Exercises       17 1100          Subjective Comments    Subjective Comments Pt reports he dont sleep well at night, Pt states he took aome medician earlier this morning and is not hurting now.  -      Subjective Pain    Able to rate subjective pain? yes  -      Pre-Treatment Pain Level 0  -      Exercise 1    Exercise Name 1 pro ll level 4.5 seat 10  -      Time (Minutes) 1 10'  -      Exercise 2    Exercise Name 2 incline stretch  -      Reps 2 2  -JH      Time (Seconds) 2 30  -JH      Exercise 3    Exercise Name 3 HS stretch   -JH      Reps 3 2  -JH      Time (Seconds) 3 30  -JH      Exercise 4    Exercise Name 4 off step CR  -      Sets 4 2  -JH      Reps 4 10  -JH      Exercise 5    Exercise Name 5 step up 6 in fwd  -      Sets 5 2  -JH      Reps 5 10  -JH      Exercise 6    Exercise Name 6 MS on air ex  -      Reps 6 20  -JH      Time (Minutes) 6 10  -JH      Exercise 7    Exercise Name 7 SLS on airex  -      Reps 7 3  -JH      Time (Seconds) 7 20 sec  -JH      Exercise 8    Exercise Name 8 airex beam tandem side step  -      Reps 8 --   5 laps  -      Exercise 9    Exercise Name 9 step up and over bosu  -      Sets 9 2  -      Reps 9 10  -JH      Exercise 10    Exercise Name 10 fluidotherapy  -      Reps 10 10  -JH        User Key  (r) = Recorded By, (t) = Taken By, (c) = Cosigned By    Initials Name Provider Type    WAGNER Nazario PTA Physical Therapy Assistant                               PT OP Goals       06/13/17 1100       PT Short Term Goals    STG Date to Achieve 06/22/17  -     STG 1 decrease L foot pain to 0/10  -     STG 1 Progress Met  -     STG 2 perform 3 sets of 10 mini squats with L AFO pain free  -     STG 2 Progress Progressing  -     STG 3 Inc SLS L foot to 10 sec  -     STG 3 Progress Progressing  -     Long Term Goals    LTG Date to Achieve 06/30/17  -     LTG 1 Ind w/ final HEP  -     LTG 1 Progress Met  -     LTG 2 decrease L hamstring and calf mm flexibility from min to WNL  -     LTG 2 Progress Not Met  -     LTG 3  perform step ups for 3 sets of 10 in L AFO pain free  -     LTG 3 Progress Progressing  -     LTG 4 Inc LEFS score to > or equal to 70/80  -     LTG 4 Progress Not Met  -       User Key  (r) = Recorded By, (t) = Taken By, (c) = Cosigned By    Initials Name Provider Type    WAGNER Nazario PTA Physical Therapy Assistant                    Time Calculation:   Start Time: 1055  Stop Time: 1200  Time Calculation (min): 65 min    Therapy Charges for  Today     Code Description Service Date Service Provider Modifiers Qty    65579503603 HC PT THER PROC EA 15 MIN 6/13/2017 Flavio Nazario PTA GP 4                    Flavio Nazario PTA  6/13/2017

## 2017-06-20 ENCOUNTER — APPOINTMENT (OUTPATIENT)
Dept: PHYSICAL THERAPY | Facility: HOSPITAL | Age: 22
End: 2017-06-20

## 2017-06-22 ENCOUNTER — HOSPITAL ENCOUNTER (OUTPATIENT)
Dept: PHYSICAL THERAPY | Facility: HOSPITAL | Age: 22
Setting detail: THERAPIES SERIES
Discharge: HOME | End: 2017-06-22

## 2017-06-22 DIAGNOSIS — M79.672 LEFT FOOT PAIN: Primary | ICD-10-CM

## 2017-06-22 PROCEDURE — 97110 THERAPEUTIC EXERCISES: CPT

## 2017-06-22 NOTE — THERAPY TREATMENT NOTE
Outpatient Physical Therapy Ortho Treatment Note  [facility]     Patient Name: Artemio Lockhart  : 1995  MRN: [id number]  Today's Date: 2017      Visit Date: 2017      Subjective Improvement: 49%  Attendance:   ( 3 more visits for the year until 17)  Next MD Visit : unsure  Recert Date:  17      Therapy Diagnosis:  L Foot Pain/Weakness/Hypersensitivity      Visit Dx:    ICD-10-CM ICD-9-CM   1. Left foot pain M79.672 729.5       There is no problem list on file for this patient.       Past Medical History:   Diagnosis Date   • Cancer    • Cellulitis    • Closed fracture of foot     Closed fracture of foot - History of   • Congenital anomaly of foot    • Congenital pes cavus    • Cradle cap     Cradle cap - History of   • Foot pain     Growth plate inflammation, left foot   • H/O leukemia    • Myopia    • Otitis media     Bilateral        Past Surgical History:   Procedure Laterality Date   • ANKLE SURGERY Left 2015    First Ankle surgery to correct cavovarus deformity/Second surgery 16   • HERNIA REPAIR               PT Ortho       17 1015    Posture/Observations    Posture/Observations Comments non antalgic; AFO L foot;minimal sensitivity noted on left lateral foot this date.    -      User Key  (r) = Recorded By, (t) = Taken By, (c) = Cosigned By    Initials Name Provider Type    FRANCY Steele PTA Physical Therapy Assistant                            PT Assessment/Plan       17 1015       PT Assessment    Assessment Comments decrease sensitivity in the left foot this date.  Improved gait ans SLS but SLS still challengening. Fatigued with mini squats but no increase pain with that or step ups.   -     PT Plan    PT Frequency 2x/week  -     Predicted Duration of Therapy Intervention (days/wks) 4 weeks  -     PT Plan Comments Recert with Pt next week.  # more approved visits until 17;  Possible d/c to independent management  "secondary to insurance after next week.   -KH       User Key  (r) = Recorded By, (t) = Taken By, (c) = Cosigned By    Initials Name Provider Type    FRANCY Steele PTA Physical Therapy Assistant                    Exercises       06/22/17 1015          Subjective Comments    Subjective Comments Norman reports that his foot is not as sensitive as it used to be.  Enjoys the fluido machine now.  Foot not painful today either.   -KH      Subjective Pain    Able to rate subjective pain? yes  -KH      Pre-Treatment Pain Level 0  -KH      Exercise 1    Exercise Name 1 pro ll level 4  -KH      Time (Minutes) 1 10'  -KH      Exercise 2    Exercise Name 2 incline stretch  -KH      Reps 2 3  -KH      Time (Seconds) 2 30  -KH      Exercise 3    Exercise Name 3 BOSU Step Ups  -KH      Sets 3 2  -KH      Reps 3 10  -KH      Time (Minutes) 3 fwd/lat  -KH      Exercise 4    Exercise Name 4 Mini Squats  -KH      Sets 4 3  -KH      Reps 4 10  -KH      Exercise 5    Exercise Name 5 SLS L LE  -KH      Sets 5 1  -KH      Reps 5 5  -KH      Time (Seconds) 5 10\" (only able to complet x1 w/o HHA)  -KH      Exercise 6    Exercise Name 6 fludiotherapy for desensitzation  -KH      Time (Minutes) 6 10' L Foot  -KH        User Key  (r) = Recorded By, (t) = Taken By, (c) = Cosigned By    Initials Name Provider Type    FRANCY Steele PTA Physical Therapy Assistant                               PT OP Goals       06/22/17 1015       PT Short Term Goals    STG Date to Achieve 06/22/17  -KH     STG 1 decrease L foot pain to 0/10  -KH     STG 1 Progress Met  -KH     STG 2 perform 3 sets of 10 mini squats with L AFO pain free  -KH     STG 2 Progress Met  -KH     STG 3 Inc SLS L foot to 10 sec  -KH     STG 3 Progress Not Met  -KH     Long Term Goals    LTG Date to Achieve 06/30/17  -KH     LTG 1 Ind w/ final HEP  -KH     LTG 1 Progress Met  -KH     LTG 2 decrease L hamstring and calf mm flexibility from min to WNL  -KH     LTG 2 Progress Not Met  -KH     " LTG 3  perform step ups for 3 sets of 10 in L AFO pain free  -     LTG 3 Progress Met  -     LTG 4 Inc LEFS score to > or equal to 70/80  -     LTG 4 Progress Not Met  -     Time Calculation    PT Goal Re-Cert Due Date 06/30/17  -       User Key  (r) = Recorded By, (t) = Taken By, (c) = Cosigned By    Initials Name Provider Type    FRANCY Steele PTA Physical Therapy Assistant                    Time Calculation:   Start Time: 1015  Stop Time: 1103  Time Calculation (min): 48 min  Total Timed Code Minutes- PT: 48 minute(s)    Therapy Charges for Today     Code Description Service Date Service Provider Modifiers Qty    43849269572 HC PT THER PROC EA 15 MIN 6/22/2017 Letha Steele PTA GP 3                    Letha Steele PTA  6/22/2017

## 2017-06-28 ENCOUNTER — HOSPITAL ENCOUNTER (OUTPATIENT)
Dept: PHYSICAL THERAPY | Facility: HOSPITAL | Age: 22
Setting detail: THERAPIES SERIES
Discharge: HOME | End: 2017-06-28

## 2017-06-28 DIAGNOSIS — M79.672 LEFT FOOT PAIN: Primary | ICD-10-CM

## 2017-06-28 PROCEDURE — 97110 THERAPEUTIC EXERCISES: CPT

## 2017-06-28 NOTE — THERAPY TREATMENT NOTE
Outpatient Physical Therapy Ortho Treatment Note  [facility]     Patient Name: Artemio Lockhart  : 1995  MRN: [id number]  Today's Date: 2017      Visit Date: 2017   Pt reports 0/10 pain.  Reports 50% of improvement.  Attended 18/ visits.  Insurance available:20 visits   Next MD appt: ns.  Recertification: 2017.    Visit Dx:    ICD-10-CM ICD-9-CM   1. Left foot pain M79.672 729.5       There is no problem list on file for this patient.       Past Medical History:   Diagnosis Date   • Cancer    • Cellulitis    • Closed fracture of foot     Closed fracture of foot - History of   • Congenital anomaly of foot    • Congenital pes cavus    • Cradle cap     Cradle cap - History of   • Foot pain     Growth plate inflammation, left foot   • H/O leukemia    • Myopia    • Otitis media     Bilateral        Past Surgical History:   Procedure Laterality Date   • ANKLE SURGERY Left 2015    First Ankle surgery to correct cavovarus deformity/Second surgery 16   • HERNIA REPAIR               PT Ortho       17 1500    Subjective Comments    Subjective Comments (P)  reports not as sensitive as he normally was. states he has no pain this visit  -HB    Subjective Pain    Able to rate subjective pain? (P)  yes  -HB    Pre-Treatment Pain Level (P)  0  -HB    Posture/Observations    Posture/Observations Comments (P)  no antalgic gait, AFO left foot  -    Sensation    Additional Comments (P)  hypersensitive on L foot on Llat border (sometimes)  -      User Key  (r) = Recorded By, (t) = Taken By, (c) = Cosigned By    Initials Name Provider Type    ROSA Caballero, ATC                             PT Assessment/Plan       17 1600       PT Assessment    Assessment Comments (P)  pt tolerated treatment well.  During SLS on L foot pt got tired.  Pt shows decreased hypersensitivity in L foot on Lat border.  -     PT Plan    PT Frequency (P)  2x/week  -   "   Predicted Duration of Therapy Intervention (days/wks) (P)  4 weeks  -HB     PT Plan Comments (P)  Continue POC. Work on balance exercises.  Work on SLS for 10 seconds at beginning of treatment. discharge next visit per insurance    -HB       User Key  (r) = Recorded By, (t) = Taken By, (c) = Cosigned By    Initials Name Provider Type    HB Mansi Caballero, ATC                     Exercises       06/28/17 1500          Subjective Comments    Subjective Comments (P)  reports not as sensitive as he normally was. states he has no pain this visit  -HB      Subjective Pain    Able to rate subjective pain? (P)  yes  -HB      Pre-Treatment Pain Level (P)  0  -HB      Exercise 1    Exercise Name 1 (P)  Pro 2 L4.0  -HB      Time (Minutes) 1 (P)  10 min  -HB      Exercise 2    Exercise Name 2 (P)  incline stretch  -HB      Reps 2 (P)  3  -HB      Time (Seconds) 2 (P)  30  -HB      Exercise 3    Exercise Name 3 (P)  hamstring stretch  -HB      Sets 3 (P)  3  -HB      Time (Seconds) 3 (P)  30  -HB      Exercise 4    Exercise Name 4 (P)  BOSU step ups fwd/lat  -HB      Sets 4 (P)  2  -HB      Reps 4 (P)  10  -HB      Exercise 5    Exercise Name 5 (P)  heel toe w/ foam beam  -HB      Sets 5 (P)  1  -HB      Reps 5 (P)  10  -HB      Time (Minutes) 5 (P)  --   back and forth  -HB      Exercise 6    Exercise Name 6 (P)  lat walk w/ foam beam  -HB      Sets 6 (P)  1  -HB      Reps 6 (P)  10  -HB      Time (Minutes) 6 (P)  --   back and forth  -HB      Exercise 7    Exercise Name 7 (P)  SLS L LE  -HB      Sets 7 (P)  1  -HB      Reps 7 (P)  5  -HB      Time (Seconds) 7 (P)  10\"   only able to complete 1x w/o holding on   -HB      Exercise 8    Exercise Name 8 (P)  Fluidotherapy  -HB      Time (Minutes) 8 (P)  10'  -HB        User Key  (r) = Recorded By, (t) = Taken By, (c) = Cosigned By    Initials Name Provider Type    HB Mansi LINDA Federico, ATC                                PT OP Goals       06/28/17 1500 "       PT Short Term Goals    STG Date to Achieve (P)  06/22/17  -HB     STG 1 (P)  decrease L foot pain to 0/10  -HB     STG 1 Progress (P)  Met  -HB     STG 2 (P)  perform 3 sets of 10 mini squats with L AFO pain free  -HB     STG 2 Progress (P)  Met  -HB     STG 3 (P)  Inc SLS L foot to 10 sec  -HB     STG 3 Progress (P)  Progressing;Not Met  -HB     Long Term Goals    LTG Date to Achieve (P)  06/30/17  -HB     LTG 1 (P)  Ind w/ final HEP  -HB     LTG 1 Progress (P)  Met  -HB     LTG 2 (P)  decrease L hamstring and calf mm flexibility from min to WNL  -HB     LTG 2 Progress (P)  Not Met  -HB     LTG 3 (P)   perform step ups for 3 sets of 10 in L AFO pain free  -HB     LTG 3 Progress (P)  Met  -HB     LTG 4 (P)  Inc LEFS score to > or equal to 70/80  -HB     LTG 4 Progress (P)  Not Met  -HB     Time Calculation    PT Goal Re-Cert Due Date (P)  06/30/17  -HB       User Key  (r) = Recorded By, (t) = Taken By, (c) = Cosigned By    Initials Name Provider Type    HB Mansi Caballero, ATC                 Therapy Education       06/28/17 1600          Therapy Education    Given (P)  HEP  -HB      Program (P)  Reinforced  -HB      How Provided (P)  Verbal  -HB      Provided to (P)  Patient  -HB      Level of Understanding (P)  Verbalized  -HB        User Key  (r) = Recorded By, (t) = Taken By, (c) = Cosigned By    Initials Name Provider Type    HB Mansi Caballero, ATC                 Time Calculation:   Start Time: (P) 1516  Stop Time: (P) 1608  Time Calculation (min): (P) 52 min  Total Timed Code Minutes- PT: (P) 52 minute(s)    Therapy Charges for Today     Code Description Service Date Service Provider Modifiers Qty    [id number]  PT THER PROC EA 15 MIN 6/28/2017 Mansi Caballero, ATC  3                    Mansieugenio Caballero, ATC  6/28/2017

## 2017-06-30 ENCOUNTER — HOSPITAL ENCOUNTER (OUTPATIENT)
Dept: PHYSICAL THERAPY | Facility: HOSPITAL | Age: 22
Setting detail: THERAPIES SERIES
Discharge: HOME | End: 2017-06-30

## 2017-06-30 DIAGNOSIS — M79.672 LEFT FOOT PAIN: Primary | ICD-10-CM

## 2017-06-30 PROCEDURE — 97110 THERAPEUTIC EXERCISES: CPT | Performed by: PHYSICAL THERAPY ASSISTANT

## 2019-11-18 ENCOUNTER — OFFICE VISIT (OUTPATIENT)
Dept: FAMILY MEDICINE CLINIC | Facility: CLINIC | Age: 24
End: 2019-11-18

## 2019-11-18 DIAGNOSIS — F41.1 GENERALIZED ANXIETY DISORDER: ICD-10-CM

## 2019-11-18 DIAGNOSIS — F79 INTELLECTUAL DISABILITY: Primary | ICD-10-CM

## 2019-11-18 PROCEDURE — 90791 PSYCH DIAGNOSTIC EVALUATION: CPT | Performed by: PSYCHOLOGIST

## 2019-11-25 NOTE — PROGRESS NOTES
11/24/2019    Artemio Lockhart, a 24 y.o. male, was seen today for initial appointment lasting 45 minutes.  Patient is referred by Alessio Starks MD.     SUBJECTIVE:  HE is experiencing the following symptoms: feelings of nervousness, social isolation, low tolerance to stress, sub-average cognitive ability, poor social skills, and conflict with his family, and poor anger control.    FAMILY HISTORY:  IDD/MR- 1/2 brother  Depression- client  Anxiety- client  Alcohol- none  Drug- none    He lives in [facility] with his biological mother.  His older half brother lives in [place], [place].  He does not maintain ayesha regular contact with the brother.    He gradated from [employer] in 2016. He is unemployed.      He noted some conflict with his father who would wish him to become more independent in spite of his cognitive delays.      He denied a history of trauma.      He received mental health treat at [facility] from the [facility] from 2010-16.    MENTAL STATUS:  HE was alert and oriented to time, place and person.  HE denied SI/HI.    DIAGNOSIS:    ICD-10-CM ICD-9-CM   1. Intellectual disability F79 319   2. Generalized anxiety disorder F41.1 300.02       ASSESSMENT PLAN:  He will follow up with the [facility].           This document has been electronically signed by Jey Beckford, PhD on November 24, 2019 7:56 PM

## 2020-01-09 ENCOUNTER — OFFICE VISIT (OUTPATIENT)
Dept: FAMILY MEDICINE CLINIC | Facility: CLINIC | Age: 25
End: 2020-01-09

## 2020-01-09 DIAGNOSIS — F41.1 GENERALIZED ANXIETY DISORDER: Primary | ICD-10-CM

## 2020-01-09 PROCEDURE — 90837 PSYTX W PT 60 MINUTES: CPT | Performed by: PSYCHOLOGIST

## 2020-03-03 ENCOUNTER — OFFICE VISIT (OUTPATIENT)
Dept: FAMILY MEDICINE CLINIC | Facility: CLINIC | Age: 25
End: 2020-03-03

## 2020-03-03 DIAGNOSIS — F33.1 MAJOR DEPRESSIVE DISORDER, RECURRENT EPISODE, MODERATE (HCC): Primary | ICD-10-CM

## 2020-03-03 PROCEDURE — 90837 PSYTX W PT 60 MINUTES: CPT | Performed by: PSYCHOLOGIST

## 2020-04-28 ENCOUNTER — OFFICE VISIT (OUTPATIENT)
Dept: FAMILY MEDICINE CLINIC | Facility: CLINIC | Age: 25
End: 2020-04-28

## 2020-04-28 DIAGNOSIS — F33.1 MAJOR DEPRESSIVE DISORDER, RECURRENT EPISODE, MODERATE (HCC): Primary | ICD-10-CM

## 2020-04-28 PROCEDURE — 90837 PSYTX W PT 60 MINUTES: CPT | Performed by: PSYCHOLOGIST

## 2020-04-28 NOTE — PROGRESS NOTES
PROGRESS NOTE  Data:  Artemio Lockhart was seen for their regularly scheduled individual psychotherapy session.    This visit has been rescheduled as a phone visit to comply with patient safety concerns in accordance with CDC recommendations. Total time of discussion was 60 minutes.    You have chosen to receive care through a telephone visit. Do you consent to use a telephone visit for your medical care today? YES    (Scales based on 0 - 10 with 10 being the worst)  Depression: 4 Anxiety: 2   Distress: 2 Sleep: 1   Tasks Completed on Time: 1 Mood: 2   Number of Panic Attacks: 1 Appetite: 2       Mental Status Exam  Appearance:  N/A  Attitude toward clinician:  cooperative and agreeable   Speech:    Rate:  regular rate and rhythm   Volume:  normal  Motor:  no abnormal movements present  Mood:  sad  Affect:  N/A  Thought Processes:  linear, logical, and goal directed  Thought Content:  normal  Suicidal Thoughts:  absent  Homicidal Thoughts:  absent  Perceptual Disturbance: no perceptual disturbance  Attention and Concentration:  good  Insight and Judgement:  fair  Memory:  memory appears to be intact    Patient's Support Network Includes:  He lives in the home with his biological parents.  Assessment/Plan   Clinical Maneuvering/Intervention:  Therapist & client discussed: (1) the use of CBT skills, (2) the ways he can reduce depression, (3) the ways he can increase social skills, (4) the impact of COVID-19 on his mental health, (5) the ways he can reduce conflict with his half-brother, and (6) the long term goals.    Diagnoses and all orders for this visit:    Major depressive disorder, recurrent episode, moderate (CMS/HCC)      No follow-ups on file.

## 2024-08-14 NOTE — PROGRESS NOTES
PROGRESS NOTE  Data:  Artemio Lockhart was seen for their regularly scheduled individual psychotherapy session.    (Scales based on 0 - 10 with 10 being the worst)  Depression: 2 Anxiety: 3   Distress: 2 Sleep: 2   Tasks Completed on Time: 3 Mood: 3   Number of Panic Attacks: 2 Appetite: 2       Mental Status Exam  Appearance:  clean and casually dressed, appropriate  Attitude toward clinician:  cooperative and agreeable   Speech:    Rate:  regular rate and rhythm   Volume:  normal  Motor:  no abnormal movements present  Mood:  WNL  Affect:  mood congruent  Thought Processes:  linear, logical, and goal directed  Thought Content:  normal  Suicidal Thoughts:  absent  Homicidal Thoughts:  absent  Perceptual Disturbance: no perceptual disturbance  Attention and Concentration:  good  Insight and Judgement:  good  Memory:  memory appears to be intact    Patient's Support Network Includes:  He lives in the home with his biological parents.  Assessment/Plan   Clinical Maneuvering/Intervention:  Therapist & client discussed: (1) the use of self-time-out, (2) the ways he can reduce conflict peers, (3) the medications used to address anxiety, (4) the conficit with his brother, and (5) the long term goals.      Diagnoses and all orders for this visit:    Major depressive disorder, recurrent episode, moderate (CMS/HCC)    I spent 60 minutes in direct face to face contact with patient.  Greater than 50% of this time was spent counseling patient and discussing plan of care.  No follow-ups on file.              ----- Message from Angelikavirginiahilaria Wallace sent at 8/14/2024  2:28 PM CDT -----  Contact: Mom [phone]  Would like to receive medical advice.    Would they like a call back or a response via [facility]:  call back    Additional information: Calling to speak with the office about getting the pt an appt for her to be able to return to school the mother states that the pt does have a fever and a cough. Please call to advise.